# Patient Record
Sex: FEMALE | Race: WHITE | NOT HISPANIC OR LATINO | Employment: OTHER | URBAN - METROPOLITAN AREA
[De-identification: names, ages, dates, MRNs, and addresses within clinical notes are randomized per-mention and may not be internally consistent; named-entity substitution may affect disease eponyms.]

---

## 2020-11-24 PROCEDURE — U0004 COV-19 TEST NON-CDC HGH THRU: HCPCS | Performed by: NURSE PRACTITIONER

## 2020-11-29 ENCOUNTER — HOSPITAL ENCOUNTER (EMERGENCY)
Facility: HOSPITAL | Age: 50
Discharge: LEFT BEFORE DISCHARGE | End: 2020-11-30
Attending: EMERGENCY MEDICINE | Admitting: EMERGENCY MEDICINE

## 2020-11-29 ENCOUNTER — APPOINTMENT (OUTPATIENT)
Dept: CT IMAGING | Facility: HOSPITAL | Age: 50
End: 2020-11-29

## 2020-11-29 DIAGNOSIS — N28.9 ACUTE RENAL INSUFFICIENCY: ICD-10-CM

## 2020-11-29 DIAGNOSIS — F17.200 TOBACCO DEPENDENCE: ICD-10-CM

## 2020-11-29 DIAGNOSIS — B34.0 ADENOVIRUS INFECTION: Primary | ICD-10-CM

## 2020-11-29 DIAGNOSIS — E11.65 UNCONTROLLED TYPE 2 DIABETES MELLITUS WITH HYPERGLYCEMIA (HCC): ICD-10-CM

## 2020-11-29 DIAGNOSIS — R06.02 SHORTNESS OF BREATH: ICD-10-CM

## 2020-11-29 DIAGNOSIS — Z91.199 H/O NONCOMPLIANCE WITH MEDICAL TREATMENT, PRESENTING HAZARDS TO HEALTH: ICD-10-CM

## 2020-11-29 LAB
ALBUMIN SERPL-MCNC: 4.1 G/DL (ref 3.5–5.2)
ALBUMIN/GLOB SERPL: 1.1 G/DL
ALP SERPL-CCNC: 135 U/L (ref 39–117)
ALT SERPL W P-5'-P-CCNC: <5 U/L (ref 1–33)
ANION GAP SERPL CALCULATED.3IONS-SCNC: 13 MMOL/L (ref 5–15)
AST SERPL-CCNC: 12 U/L (ref 1–32)
B PARAPERT DNA SPEC QL NAA+PROBE: NOT DETECTED
B PERT DNA SPEC QL NAA+PROBE: NOT DETECTED
B-OH-BUTYR SERPL-SCNC: 0.39 MMOL/L (ref 0.02–0.27)
BASOPHILS # BLD AUTO: 0.07 10*3/MM3 (ref 0–0.2)
BASOPHILS NFR BLD AUTO: 0.4 % (ref 0–1.5)
BILIRUB SERPL-MCNC: 0.7 MG/DL (ref 0–1.2)
BILIRUB UR QL STRIP: NEGATIVE
BUN SERPL-MCNC: 25 MG/DL (ref 6–20)
BUN/CREAT SERPL: 19.8 (ref 7–25)
C PNEUM DNA NPH QL NAA+NON-PROBE: NOT DETECTED
CALCIUM SPEC-SCNC: 9.4 MG/DL (ref 8.6–10.5)
CHLORIDE SERPL-SCNC: 87 MMOL/L (ref 98–107)
CLARITY UR: CLEAR
CO2 SERPL-SCNC: 23 MMOL/L (ref 22–29)
COLOR UR: YELLOW
CREAT SERPL-MCNC: 1.26 MG/DL (ref 0.57–1)
CRP SERPL-MCNC: 0.67 MG/DL (ref 0–0.5)
D DIMER PPP FEU-MCNC: 0.42 MCGFEU/ML (ref 0–0.56)
D-LACTATE SERPL-SCNC: 2.2 MMOL/L (ref 0.5–2)
DEPRECATED RDW RBC AUTO: 42.2 FL (ref 37–54)
EOSINOPHIL # BLD AUTO: 0.1 10*3/MM3 (ref 0–0.4)
EOSINOPHIL NFR BLD AUTO: 0.6 % (ref 0.3–6.2)
ERYTHROCYTE [DISTWIDTH] IN BLOOD BY AUTOMATED COUNT: 12.6 % (ref 12.3–15.4)
FLUAV H1 2009 PAND RNA NPH QL NAA+PROBE: NOT DETECTED
FLUAV H1 HA GENE NPH QL NAA+PROBE: NOT DETECTED
FLUAV H3 RNA NPH QL NAA+PROBE: NOT DETECTED
FLUAV SUBTYP SPEC NAA+PROBE: NOT DETECTED
FLUBV RNA ISLT QL NAA+PROBE: NOT DETECTED
GFR SERPL CREATININE-BSD FRML MDRD: 45 ML/MIN/1.73
GLOBULIN UR ELPH-MCNC: 3.7 GM/DL
GLUCOSE BLDC GLUCOMTR-MCNC: 457 MG/DL (ref 70–130)
GLUCOSE BLDC GLUCOMTR-MCNC: 510 MG/DL (ref 70–130)
GLUCOSE SERPL-MCNC: 691 MG/DL (ref 65–99)
GLUCOSE UR STRIP-MCNC: ABNORMAL MG/DL
HADV DNA SPEC NAA+PROBE: DETECTED
HCOV 229E RNA SPEC QL NAA+PROBE: NOT DETECTED
HCOV HKU1 RNA SPEC QL NAA+PROBE: NOT DETECTED
HCOV NL63 RNA SPEC QL NAA+PROBE: NOT DETECTED
HCOV OC43 RNA SPEC QL NAA+PROBE: NOT DETECTED
HCT VFR BLD AUTO: 47 % (ref 34–46.6)
HGB BLD-MCNC: 16.8 G/DL (ref 12–15.9)
HGB UR QL STRIP.AUTO: NEGATIVE
HMPV RNA NPH QL NAA+NON-PROBE: NOT DETECTED
HOLD SPECIMEN: NORMAL
HOLD SPECIMEN: NORMAL
HPIV1 RNA SPEC QL NAA+PROBE: NOT DETECTED
HPIV2 RNA SPEC QL NAA+PROBE: NOT DETECTED
HPIV3 RNA NPH QL NAA+PROBE: NOT DETECTED
HPIV4 P GENE NPH QL NAA+PROBE: NOT DETECTED
IMM GRANULOCYTES # BLD AUTO: 0.11 10*3/MM3 (ref 0–0.05)
IMM GRANULOCYTES NFR BLD AUTO: 0.7 % (ref 0–0.5)
KETONES UR QL STRIP: ABNORMAL
LDH SERPL-CCNC: 225 U/L (ref 135–214)
LEUKOCYTE ESTERASE UR QL STRIP.AUTO: NEGATIVE
LYMPHOCYTES # BLD AUTO: 1.74 10*3/MM3 (ref 0.7–3.1)
LYMPHOCYTES NFR BLD AUTO: 11.2 % (ref 19.6–45.3)
M PNEUMO IGG SER IA-ACNC: NOT DETECTED
MCH RBC QN AUTO: 32.7 PG (ref 26.6–33)
MCHC RBC AUTO-ENTMCNC: 35.7 G/DL (ref 31.5–35.7)
MCV RBC AUTO: 91.4 FL (ref 79–97)
MONOCYTES # BLD AUTO: 0.94 10*3/MM3 (ref 0.1–0.9)
MONOCYTES NFR BLD AUTO: 6 % (ref 5–12)
NEUTROPHILS NFR BLD AUTO: 12.62 10*3/MM3 (ref 1.7–7)
NEUTROPHILS NFR BLD AUTO: 81.1 % (ref 42.7–76)
NITRITE UR QL STRIP: NEGATIVE
NRBC BLD AUTO-RTO: 0 /100 WBC (ref 0–0.2)
PH UR STRIP.AUTO: <=5 [PH] (ref 5–8)
PLATELET # BLD AUTO: 205 10*3/MM3 (ref 140–450)
PMV BLD AUTO: 12.5 FL (ref 6–12)
POTASSIUM SERPL-SCNC: 4.6 MMOL/L (ref 3.5–5.2)
PROCALCITONIN SERPL-MCNC: 0.04 NG/ML (ref 0–0.25)
PROT SERPL-MCNC: 7.8 G/DL (ref 6–8.5)
PROT UR QL STRIP: NEGATIVE
RBC # BLD AUTO: 5.14 10*6/MM3 (ref 3.77–5.28)
RHINOVIRUS RNA SPEC NAA+PROBE: NOT DETECTED
RSV RNA NPH QL NAA+NON-PROBE: NOT DETECTED
SARS-COV-2 RNA NPH QL NAA+NON-PROBE: NOT DETECTED
SODIUM SERPL-SCNC: 123 MMOL/L (ref 136–145)
SP GR UR STRIP: 1.03 (ref 1–1.03)
UROBILINOGEN UR QL STRIP: ABNORMAL
WBC # BLD AUTO: 15.58 10*3/MM3 (ref 3.4–10.8)
WHOLE BLOOD HOLD SPECIMEN: NORMAL
WHOLE BLOOD HOLD SPECIMEN: NORMAL

## 2020-11-29 PROCEDURE — 82010 KETONE BODYS QUAN: CPT | Performed by: PHYSICIAN ASSISTANT

## 2020-11-29 PROCEDURE — 83615 LACTATE (LD) (LDH) ENZYME: CPT | Performed by: PHYSICIAN ASSISTANT

## 2020-11-29 PROCEDURE — 0 IOPAMIDOL PER 1 ML: Performed by: EMERGENCY MEDICINE

## 2020-11-29 PROCEDURE — 81003 URINALYSIS AUTO W/O SCOPE: CPT | Performed by: PHYSICIAN ASSISTANT

## 2020-11-29 PROCEDURE — 82962 GLUCOSE BLOOD TEST: CPT

## 2020-11-29 PROCEDURE — 71275 CT ANGIOGRAPHY CHEST: CPT

## 2020-11-29 PROCEDURE — 85379 FIBRIN DEGRADATION QUANT: CPT | Performed by: PHYSICIAN ASSISTANT

## 2020-11-29 PROCEDURE — 99283 EMERGENCY DEPT VISIT LOW MDM: CPT

## 2020-11-29 PROCEDURE — 96375 TX/PRO/DX INJ NEW DRUG ADDON: CPT

## 2020-11-29 PROCEDURE — 63710000001 INSULIN REGULAR HUMAN PER 5 UNITS: Performed by: PHYSICIAN ASSISTANT

## 2020-11-29 PROCEDURE — 86140 C-REACTIVE PROTEIN: CPT | Performed by: PHYSICIAN ASSISTANT

## 2020-11-29 PROCEDURE — 83605 ASSAY OF LACTIC ACID: CPT | Performed by: PHYSICIAN ASSISTANT

## 2020-11-29 PROCEDURE — 96374 THER/PROPH/DIAG INJ IV PUSH: CPT

## 2020-11-29 PROCEDURE — 85025 COMPLETE CBC W/AUTO DIFF WBC: CPT

## 2020-11-29 PROCEDURE — 25010000002 DEXAMETHASONE PER 1 MG: Performed by: PHYSICIAN ASSISTANT

## 2020-11-29 PROCEDURE — 25010000002 ONDANSETRON PER 1 MG: Performed by: PHYSICIAN ASSISTANT

## 2020-11-29 PROCEDURE — 0202U NFCT DS 22 TRGT SARS-COV-2: CPT | Performed by: PHYSICIAN ASSISTANT

## 2020-11-29 PROCEDURE — 80053 COMPREHEN METABOLIC PANEL: CPT

## 2020-11-29 PROCEDURE — 84145 PROCALCITONIN (PCT): CPT | Performed by: PHYSICIAN ASSISTANT

## 2020-11-29 RX ORDER — SODIUM CHLORIDE 0.9 % (FLUSH) 0.9 %
10 SYRINGE (ML) INJECTION AS NEEDED
Status: DISCONTINUED | OUTPATIENT
Start: 2020-11-29 | End: 2020-11-30 | Stop reason: HOSPADM

## 2020-11-29 RX ORDER — DEXAMETHASONE SODIUM PHOSPHATE 4 MG/ML
8 INJECTION, SOLUTION INTRA-ARTICULAR; INTRALESIONAL; INTRAMUSCULAR; INTRAVENOUS; SOFT TISSUE ONCE
Status: COMPLETED | OUTPATIENT
Start: 2020-11-29 | End: 2020-11-29

## 2020-11-29 RX ORDER — ONDANSETRON 2 MG/ML
4 INJECTION INTRAMUSCULAR; INTRAVENOUS ONCE
Status: COMPLETED | OUTPATIENT
Start: 2020-11-29 | End: 2020-11-29

## 2020-11-29 RX ADMIN — INSULIN HUMAN 10 UNITS: 100 INJECTION, SOLUTION PARENTERAL at 22:18

## 2020-11-29 RX ADMIN — ONDANSETRON 4 MG: 2 INJECTION INTRAMUSCULAR; INTRAVENOUS at 20:08

## 2020-11-29 RX ADMIN — DEXAMETHASONE SODIUM PHOSPHATE 8 MG: 4 INJECTION, SOLUTION INTRAMUSCULAR; INTRAVENOUS at 20:08

## 2020-11-29 RX ADMIN — SODIUM CHLORIDE 1000 ML: 9 INJECTION, SOLUTION INTRAVENOUS at 20:08

## 2020-11-29 RX ADMIN — SODIUM CHLORIDE 1000 ML: 9 INJECTION, SOLUTION INTRAVENOUS at 22:18

## 2020-11-29 RX ADMIN — IOPAMIDOL 40 ML: 755 INJECTION, SOLUTION INTRAVENOUS at 21:13

## 2020-11-30 VITALS
TEMPERATURE: 98.3 F | RESPIRATION RATE: 18 BRPM | OXYGEN SATURATION: 96 % | WEIGHT: 250 LBS | HEART RATE: 78 BPM | BODY MASS INDEX: 42.68 KG/M2 | HEIGHT: 64 IN | DIASTOLIC BLOOD PRESSURE: 70 MMHG | SYSTOLIC BLOOD PRESSURE: 109 MMHG

## 2020-11-30 LAB
GLUCOSE BLDC GLUCOMTR-MCNC: 459 MG/DL (ref 70–130)
LACTATE HOLD SPECIMEN: NORMAL

## 2020-11-30 PROCEDURE — 82962 GLUCOSE BLOOD TEST: CPT

## 2021-03-15 ENCOUNTER — TRANSCRIBE ORDERS (OUTPATIENT)
Dept: DIABETES SERVICES | Facility: HOSPITAL | Age: 51
End: 2021-03-15

## 2021-03-15 DIAGNOSIS — IMO0002 DIABETES MELLITUS TYPE 2, UNCONTROLLED, WITH COMPLICATIONS: Primary | ICD-10-CM

## 2021-03-15 DIAGNOSIS — E78.5 HYPERLIPIDEMIA, UNSPECIFIED HYPERLIPIDEMIA TYPE: ICD-10-CM

## 2021-04-05 ENCOUNTER — HOSPITAL ENCOUNTER (OUTPATIENT)
Dept: NUTRITION | Facility: HOSPITAL | Age: 51
Setting detail: RECURRING SERIES
Discharge: HOME | End: 2021-04-05

## 2021-04-05 NOTE — CONSULTS
Outpatient Nutrition Services    Patient Name:  Michelle Estrada  YOB: 1970  MRN: [id number]  Admit Date:  4/5/2021    DIABETES NUTRITION EDUCATION CONSULT, 60 MINUTES. This medical referred consult was provided as a telephone call, tele-health or e-visit, as patient is unable to attend an in-office appointment due to the COVID-19 crisis. Consent for treatment was given verbally.Patient attended their comprehensive nutrition only diabetes class.  Please see media tab for assessment and notes if you use EPIC. If you are not an EPIC user a copy of patient's assessment and notes will be sent per routine. Thank you for this referral.        Electronically signed by:  Jory Miller RD  04/05/21 12:24 EDT

## 2021-04-27 ENCOUNTER — HOSPITAL ENCOUNTER (OUTPATIENT)
Dept: NUTRITION | Facility: HOSPITAL | Age: 51
Setting detail: RECURRING SERIES
Discharge: HOME | End: 2021-04-27

## 2021-04-27 NOTE — PROGRESS NOTES
DIABETES NUTRITION EDUCATION CONSULT FOLLOW UP, 30 MINUTES. This medical referred consult was provided as a telephone call, tele-health or e-visit, as patient is unable to attend an in-office appointment due to the COVID-19 crisis. Consent for treatment was given verbally.Patient attended their comprehensive nutrition only diabetes class.  Please see media tab for assessment and notes if you use EPIC. If you are not an EPIC user a copy of patient's assessment and notes will be sent per routine. Thank you for this referral.      Jory Miller RD, LD

## 2021-08-10 ENCOUNTER — TRANSCRIBE ORDERS (OUTPATIENT)
Dept: LAB | Facility: HOSPITAL | Age: 51
End: 2021-08-10

## 2021-08-10 ENCOUNTER — LAB (OUTPATIENT)
Dept: LAB | Facility: HOSPITAL | Age: 51
End: 2021-08-10

## 2021-08-10 DIAGNOSIS — Z87.898 PERSONAL HISTORY OF OTHER LYMPHATIC AND HEMATOPOIETIC NEOPLASM: ICD-10-CM

## 2021-08-10 DIAGNOSIS — Z01.818 OTHER SPECIFIED PRE-OPERATIVE EXAMINATION: ICD-10-CM

## 2021-08-10 DIAGNOSIS — Z87.898 PERSONAL HISTORY OF OTHER LYMPHATIC AND HEMATOPOIETIC NEOPLASM: Primary | ICD-10-CM

## 2021-08-10 DIAGNOSIS — R73.09 IMPAIRED GLUCOSE TOLERANCE TEST: ICD-10-CM

## 2021-08-10 LAB
ANION GAP SERPL CALCULATED.3IONS-SCNC: 7 MMOL/L (ref 5–15)
BUN SERPL-MCNC: 12 MG/DL (ref 6–20)
BUN/CREAT SERPL: 12.1 (ref 7–25)
CALCIUM SPEC-SCNC: 9.4 MG/DL (ref 8.6–10.5)
CHLORIDE SERPL-SCNC: 98 MMOL/L (ref 98–107)
CO2 SERPL-SCNC: 29 MMOL/L (ref 22–29)
CREAT SERPL-MCNC: 0.99 MG/DL (ref 0.57–1)
DEPRECATED RDW RBC AUTO: 45.1 FL (ref 37–54)
ERYTHROCYTE [DISTWIDTH] IN BLOOD BY AUTOMATED COUNT: 13.2 % (ref 12.3–15.4)
GFR SERPL CREATININE-BSD FRML MDRD: 59 ML/MIN/1.73
GLUCOSE SERPL-MCNC: 473 MG/DL (ref 65–99)
HBA1C MFR BLD: 10.3 % (ref 4.8–5.6)
HCT VFR BLD AUTO: 38.6 % (ref 34–46.6)
HGB BLD-MCNC: 13 G/DL (ref 12–15.9)
MCH RBC QN AUTO: 31.5 PG (ref 26.6–33)
MCHC RBC AUTO-ENTMCNC: 33.7 G/DL (ref 31.5–35.7)
MCV RBC AUTO: 93.5 FL (ref 79–97)
PLATELET # BLD AUTO: 154 10*3/MM3 (ref 140–450)
PMV BLD AUTO: 12.6 FL (ref 6–12)
POTASSIUM SERPL-SCNC: 4.6 MMOL/L (ref 3.5–5.2)
QT INTERVAL: 394 MS
QTC INTERVAL: 451 MS
RBC # BLD AUTO: 4.13 10*6/MM3 (ref 3.77–5.28)
SODIUM SERPL-SCNC: 134 MMOL/L (ref 136–145)
WBC # BLD AUTO: 6.49 10*3/MM3 (ref 3.4–10.8)

## 2021-08-10 PROCEDURE — 83036 HEMOGLOBIN GLYCOSYLATED A1C: CPT

## 2021-08-10 PROCEDURE — 80048 BASIC METABOLIC PNL TOTAL CA: CPT

## 2021-08-10 PROCEDURE — 36415 COLL VENOUS BLD VENIPUNCTURE: CPT

## 2021-08-10 PROCEDURE — 93010 ELECTROCARDIOGRAM REPORT: CPT | Performed by: INTERNAL MEDICINE

## 2021-08-10 PROCEDURE — 93005 ELECTROCARDIOGRAM TRACING: CPT | Performed by: ORTHOPAEDIC SURGERY

## 2021-08-10 PROCEDURE — 85027 COMPLETE CBC AUTOMATED: CPT

## 2021-08-15 ENCOUNTER — APPOINTMENT (OUTPATIENT)
Dept: PREADMISSION TESTING | Facility: HOSPITAL | Age: 51
End: 2021-08-15

## 2021-10-14 ENCOUNTER — MEDICATION THERAPY MANAGEMENT (OUTPATIENT)
Dept: PHARMACY | Facility: HOSPITAL | Age: 51
End: 2021-10-14

## 2021-10-14 ENCOUNTER — OFFICE VISIT (OUTPATIENT)
Dept: ENDOCRINOLOGY | Facility: CLINIC | Age: 51
End: 2021-10-14

## 2021-10-14 VITALS
TEMPERATURE: 97.6 F | HEART RATE: 87 BPM | OXYGEN SATURATION: 98 % | DIASTOLIC BLOOD PRESSURE: 73 MMHG | BODY MASS INDEX: 42.34 KG/M2 | HEIGHT: 64 IN | WEIGHT: 248 LBS | SYSTOLIC BLOOD PRESSURE: 109 MMHG

## 2021-10-14 DIAGNOSIS — E11.65 TYPE 2 DIABETES MELLITUS WITH HYPERGLYCEMIA, WITH LONG-TERM CURRENT USE OF INSULIN (HCC): Primary | ICD-10-CM

## 2021-10-14 DIAGNOSIS — Z79.4 TYPE 2 DIABETES MELLITUS WITH HYPERGLYCEMIA, WITH LONG-TERM CURRENT USE OF INSULIN (HCC): Primary | ICD-10-CM

## 2021-10-14 PROCEDURE — 99214 OFFICE O/P EST MOD 30 MIN: CPT | Performed by: NURSE PRACTITIONER

## 2021-10-14 RX ORDER — DULAGLUTIDE 0.75 MG/.5ML
0.75 INJECTION, SOLUTION SUBCUTANEOUS WEEKLY
Qty: 2 ML | Refills: 1 | Status: SHIPPED | OUTPATIENT
Start: 2021-10-14 | End: 2021-12-14 | Stop reason: SDUPTHER

## 2021-10-14 RX ORDER — LAMOTRIGINE 25 MG/1
50 TABLET ORAL 2 TIMES DAILY
COMMUNITY
Start: 2021-07-27 | End: 2021-12-14 | Stop reason: SDUPTHER

## 2021-10-14 RX ORDER — FENOFIBRATE 145 MG/1
145 TABLET, COATED ORAL DAILY
COMMUNITY
End: 2021-12-14 | Stop reason: SDUPTHER

## 2021-10-14 RX ORDER — ROSUVASTATIN CALCIUM 20 MG/1
20 TABLET, COATED ORAL DAILY
Qty: 30 TABLET | Refills: 5 | Status: SHIPPED | OUTPATIENT
Start: 2021-10-14 | End: 2022-06-13 | Stop reason: SDUPTHER

## 2021-10-14 RX ORDER — INSULIN ASPART 100 [IU]/ML
35 INJECTION, SOLUTION INTRAVENOUS; SUBCUTANEOUS
Qty: 30 ML | Refills: 5 | Status: SHIPPED | OUTPATIENT
Start: 2021-10-14 | End: 2022-05-20 | Stop reason: SDUPTHER

## 2021-10-14 RX ORDER — INSULIN GLARGINE 300 U/ML
75 INJECTION, SOLUTION SUBCUTANEOUS
Qty: 9 ML | Refills: 5 | Status: SHIPPED | OUTPATIENT
Start: 2021-10-14 | End: 2022-05-20 | Stop reason: SDUPTHER

## 2021-10-14 RX ORDER — INSULIN ASPART 100 [IU]/ML
45 INJECTION, SOLUTION INTRAVENOUS; SUBCUTANEOUS
COMMUNITY
Start: 2021-07-07 | End: 2021-10-14

## 2021-10-14 RX ORDER — METFORMIN HYDROCHLORIDE EXTENDED-RELEASE TABLETS 500 MG/1
500 TABLET, FILM COATED, EXTENDED RELEASE ORAL
Qty: 30 TABLET | Refills: 5 | Status: SHIPPED | OUTPATIENT
Start: 2021-10-14 | End: 2021-10-18

## 2021-10-14 RX ORDER — ROSUVASTATIN CALCIUM 20 MG/1
20 TABLET, COATED ORAL DAILY
COMMUNITY
End: 2021-10-14

## 2021-10-14 RX ORDER — INSULIN GLARGINE 300 U/ML
75 INJECTION, SOLUTION SUBCUTANEOUS
COMMUNITY
Start: 2021-08-10 | End: 2021-12-14 | Stop reason: SDUPTHER

## 2021-10-14 RX ORDER — METFORMIN HYDROCHLORIDE 500 MG/1
500 TABLET, EXTENDED RELEASE ORAL
COMMUNITY
Start: 2021-07-07 | End: 2021-10-14

## 2021-10-14 RX ORDER — PEN NEEDLE, DIABETIC 31 GX5/16"
NEEDLE, DISPOSABLE MISCELLANEOUS
COMMUNITY
Start: 2021-07-07 | End: 2022-09-28

## 2021-10-14 RX ORDER — ESCITALOPRAM OXALATE 10 MG/1
10 TABLET ORAL DAILY
COMMUNITY
Start: 2021-07-30 | End: 2021-12-14 | Stop reason: SDUPTHER

## 2021-10-14 RX ORDER — DULAGLUTIDE 0.75 MG/.5ML
0.5 INJECTION, SOLUTION SUBCUTANEOUS WEEKLY
Qty: 2 ML | Refills: 2 | Status: SHIPPED | OUTPATIENT
Start: 2021-10-14 | End: 2021-10-14

## 2021-10-14 RX ORDER — HYDROXYZINE HYDROCHLORIDE 10 MG/1
TABLET, FILM COATED ORAL DAILY PRN
COMMUNITY
Start: 2021-07-27

## 2021-10-14 NOTE — PROGRESS NOTES
Chief Complaint   Patient presents with   • Diabetes     initial encounter        Referring Provider  Maye Clements MD     HPI   Michelle Estrada is a 51 y.o. female had concerns including Diabetes (initial encounter).   She is seen as new patient today.   Diabetes was diagnosed in 2002 after having GDM in 2001.  Complications include retinopathy, neuropathy, unknown on kidney damage, no cardiac issues.  Last ophtho exam was 2 years ago.  Current medications for diabetes include Toujeo 75 units qhs, Novolog 35 units TID with meals (not always TID), Metformin 500 mg.  She is on a statin.  She has been taking her Toujeo most days and has been taking her novolog when she eats, but is frequently missing meals and missing mealtime insulin.   Past medications: injection in the past, not sure the name of it.  She checks her blood sugar not very often times per day, is moving and has not been checking it much.   BG's: F: 200's, PPD: 300-400  Hypos: occasionally (140's)    Diet:  Breakfast: not much  Lunch: varies  Dinner: cooking at home, usually carbs  Drinks: water, coffee, regular soda  Snacks: not much, if she does fruits and veggies  Exercise: not much  Had nutritional counseling in April.    She denies any previous history of pancreatitis or family history of MTC or MEN.    The following portions of the patient's history were reviewed and updated as appropriate: allergies, current medications, past family history, past medical history, past social history, past surgical history and problem list.    Past Medical History:   Diagnosis Date   • Anxiety    • Bipolar 1 disorder (HCC)    • COPD (chronic obstructive pulmonary disease) (HCC)    • Depression    • Diabetes mellitus (HCC)    • Fibromyalgia    • Neuropathy    • PTSD (post-traumatic stress disorder)      Past Surgical History:   Procedure Laterality Date   • CHOLECYSTECTOMY     • CYST REMOVAL     • KIDNEY STONE SURGERY     • TUBAL ABDOMINAL LIGATION         History reviewed. No pertinent family history.   Social History     Socioeconomic History   • Marital status: Single   Tobacco Use   • Smoking status: Current Every Day Smoker   Substance and Sexual Activity   • Alcohol use: Never   • Drug use: Not Currently     Comment: recovering   • Sexual activity: Never      Allergies   Allergen Reactions   • Dilantin [Phenytoin] Hives   • Lisinopril Cough      Current Outpatient Medications on File Prior to Visit   Medication Sig Dispense Refill   • B-D ULTRAFINE III SHORT PEN 31G X 8 MM misc      • escitalopram (LEXAPRO) 10 MG tablet Take 10 mg by mouth Daily.     • fenofibrate (TRICOR) 145 MG tablet Take 145 mg by mouth Daily.     • hydrOXYzine (ATARAX) 10 MG tablet TAKE 1 TABLET BY MOUTH 3 TIMES A DAY AS NEED FOR ANXIETY & SLEEP, MAY TAKE 2 AT BEDTIME IF NEEDED     • lamoTRIgine (LaMICtal) 25 MG tablet Take 50 mg by mouth 2 (two) times a day.     • Toujeo SoloStar 300 UNIT/ML solution pen-injector injection Inject 75 Units under the skin into the appropriate area as directed every night at bedtime.     • [DISCONTINUED] metFORMIN ER (GLUCOPHAGE-XR) 500 MG 24 hr tablet Take 500 mg by mouth every night at bedtime.     • [DISCONTINUED] NovoLOG FlexPen 100 UNIT/ML solution pen-injector sc pen Inject 45 Units under the skin into the appropriate area as directed 3 (Three) Times a Day With Meals.     • [DISCONTINUED] rosuvastatin (CRESTOR) 20 MG tablet Take 20 mg by mouth Daily.     • [DISCONTINUED] methylPREDNISolone (MEDROL) 4 MG dose pack Take as directed on package instructions. 21 tablet 0     No current facility-administered medications on file prior to visit.        Review of Systems   Constitutional: Positive for fatigue and unexpected weight gain.   HENT: Positive for congestion, rhinorrhea and sneezing.    Eyes: Positive for discharge, redness and itching.   Respiratory: Positive for cough, shortness of breath and wheezing.    Gastrointestinal: Positive for constipation  "and GERD. Negative for nausea.   Endocrine: Positive for cold intolerance, heat intolerance, polydipsia and polyuria.   Genitourinary: Positive for urgency and urinary incontinence.   Musculoskeletal: Positive for arthralgias, back pain, gait problem and neck pain.   Allergic/Immunologic: Positive for food allergies.   Neurological: Positive for tremors and syncope.   Psychiatric/Behavioral: Positive for decreased concentration, hallucinations and sleep disturbance. Negative for suicidal ideas. The patient is nervous/anxious.         /73 (BP Location: Left arm, Patient Position: Sitting, Cuff Size: Large Adult)   Pulse 87   Temp 97.6 °F (36.4 °C) (Oral)   Ht 162.6 cm (64\")   Wt 112 kg (248 lb)   SpO2 98%   BMI 42.57 kg/m²      Physical Exam  Vitals reviewed.   Constitutional:       Appearance: Normal appearance.   Eyes:      Extraocular Movements: Extraocular movements intact.   Neck:      Comments: No goiter  Cardiovascular:      Rate and Rhythm: Normal rate and regular rhythm.      Pulses: Normal pulses.           Dorsalis pedis pulses are 2+ on the right side and 2+ on the left side.        Posterior tibial pulses are 2+ on the right side and 2+ on the left side.      Heart sounds: Normal heart sounds.   Pulmonary:      Effort: Pulmonary effort is normal.      Breath sounds: Normal breath sounds.   Feet:      Right foot:      Protective Sensation: 10 sites tested. 4 sites sensed.      Skin integrity: Skin integrity normal.      Toenail Condition: Right toenails are normal.      Left foot:      Protective Sensation: 10 sites tested. 4 sites sensed.      Skin integrity: Skin integrity normal.      Toenail Condition: Left toenails are normal.      Comments: Diabetic Foot Exam Performed and Monofilament Test Performed  Neurological:      Mental Status: She is alert and oriented to person, place, and time.   Psychiatric:         Mood and Affect: Mood normal.         Behavior: Behavior normal.         " Thought Content: Thought content normal.         Judgment: Judgment normal.           CMP:  Lab Results   Component Value Date    BUN 12 08/10/2021    CREATININE 0.99 08/10/2021    EGFRIFNONA 59 (L) 08/10/2021    BCR 12.1 08/10/2021     (L) 08/10/2021    K 4.6 08/10/2021    CO2 29.0 08/10/2021    CALCIUM 9.4 08/10/2021    ALBUMIN 4.10 11/29/2020    BILITOT 0.7 11/29/2020    ALKPHOS 135 (H) 11/29/2020    AST 12 11/29/2020    ALT <5 11/29/2020     Lipid Panel:  No results found for: CHOL, TRIG, HDL, VLDL, LDL  HbA1c:  Lab Results   Component Value Date    HGBA1C 10.30 (H) 08/10/2021     Glucose:  Lab Results   Component Value Date    POCGLU ([facility]) 444+ 02/21/2021     Microalbumin:  No results found for: MALBCRERATIO  TSH:  No results found for: TSH    Assessment and Plan    Diagnoses and all orders for this visit:    1. Type 2 diabetes mellitus with hyperglycemia, with long-term current use of insulin (HCC) (Primary)  Assessment & Plan:  -Diabetes is uncontrolled with A1C >10.  -She is agreeable to implement dietary changes and exercise as tolerable for her.  -She will check her BG more often.  -Continue Toujeo 75 units qhs.  -Continue Novolog 35 units with meals.  She is going to get on a better regimen of eating more consistent meals and taking her mealtime insulin.  -Start using Freestyle Wilda CGM.  -Start Trulicity 0.75 mg weekly.   -Schedule optho exam.  -Diabetes will be reassessed in 2 months.  -S/S of hypoglycemia reviewed.          Orders:  -     Discontinue: Continuous Blood Gluc  (FreeStyle Wilda 2 [place]) device; 1 kit Every 3 (Three) Months.  Dispense: 1 each; Refill: 2  -     Discontinue: Continuous Blood Gluc Sensor (FreeStyle Wilda 2 Sensor) misc; 1 kit Every 14 (Fourteen) Days.  Dispense: 6 each; Refill: 2  -     Discontinue: Dulaglutide (Trulicity) 0.75 MG/0.5ML solution pen-injector; Inject 0.75 mg under the skin into the appropriate area as directed 1 (One) Time Per Week.  Dispense: 2  mL; Refill: 2  -     Ambulatory Referral to Specialty Pharmacy       Return in about 2 months (around 12/14/2021) for Fasting Labs, A1C, Follow-up appointment. The patient was instructed to contact the clinic with any interval questions or concerns.    JIN Jara

## 2021-10-14 NOTE — PROGRESS NOTES
Michelle Estrada is a 51 y.o. female seen by Endocrinology and assessed by the Medication Management Clinic Pharmacist at [facility]. Patient recently moved to [place] from [place]. She has been a poorly controlled T2D for 20 years.      Past Medical History:   Diagnosis Date   • Anxiety    • Bipolar 1 disorder (HCC)    • COPD (chronic obstructive pulmonary disease) (HCC)    • Depression    • Diabetes mellitus (HCC)    • Fibromyalgia    • Neuropathy    • PTSD (post-traumatic stress disorder)      Social History     Socioeconomic History   • Marital status: Single   Tobacco Use   • Smoking status: Current Every Day Smoker   Substance and Sexual Activity   • Alcohol use: Never   • Drug use: Not Currently     Comment: recovering   • Sexual activity: Never     Dilantin [phenytoin] and Lisinopril    Current Outpatient Medications:   •  B-D ULTRAFINE III SHORT PEN 31G X 8 MM misc, , Disp: , Rfl:   •  escitalopram (LEXAPRO) 10 MG tablet, Take 10 mg by mouth Daily., Disp: , Rfl:   •  fenofibrate (TRICOR) 145 MG tablet, Take 145 mg by mouth Daily., Disp: , Rfl:   •  hydrOXYzine (ATARAX) 10 MG tablet, TAKE 1 TABLET BY MOUTH 3 TIMES A DAY AS NEED FOR ANXIETY & SLEEP, MAY TAKE 2 AT BEDTIME IF NEEDED, Disp: , Rfl:   •  lamoTRIgine (LaMICtal) 25 MG tablet, Take 50 mg by mouth 2 (two) times a day., Disp: , Rfl:   •  metFORMIN ER (GLUCOPHAGE-XR) 500 MG 24 hr tablet, Take 500 mg by mouth every night at bedtime., Disp: , Rfl:   •  NovoLOG FlexPen 100 UNIT/ML solution pen-injector sc pen, Inject 45 Units under the skin into the appropriate area as directed 3 (Three) Times a Day With Meals., Disp: , Rfl:   •  rosuvastatin (CRESTOR) 20 MG tablet, Take 20 mg by mouth Daily., Disp: , Rfl:   •  Toujeo SoloStar 300 UNIT/ML solution pen-injector injection, Inject 75 Units under the skin into the appropriate area as directed every night at bedtime., Disp: , Rfl:       Labs:    There were no vitals filed for this visit.  There were  no vitals filed for this visit.  Lab Results   Component Value Date    HGBA1C 10.30 (H) 08/10/2021     Lab Results   Component Value Date    GLUCOSE 473 (C) 08/10/2021    CALCIUM 9.4 08/10/2021     (L) 08/10/2021    K 4.6 08/10/2021    CO2 29.0 08/10/2021    CL 98 08/10/2021    BUN 12 08/10/2021    CREATININE 0.99 08/10/2021    EGFRIFNONA 59 (L) 08/10/2021    BCR 12.1 08/10/2021    ANIONGAP 7.0 08/10/2021     No results found for: CHOL, CHLPL, TRIG, HDL, LDL, LDLDIRECT      Current 10-Year ASCVD Risk: no recent labs, but is on crestor 20 mg.     Drug-Drug Interactions: none relevant    Vaccination Status    Influenza: needed  Pneumococcal: UTD    Patient Assistance: not needed at this time    Medication Assessment & Plan:     Patient was counseled on how her metformin and insulin work in her body to help control her blood sugar. She was also counseled on the Rule of 15; she stated she had never heard it explained like that, but was knowledgeable about how to manage hypoglycemia. She could benefit from GLP-1 and/or increase in metformin dose.     Patient wants to switch pharmacies to [facility] since she is still using [facility] in [place]. We will transfer medications and help her with adherence and call to  on new changes.     Chinyere Dyson [facility]  10/14/2021  09:04 EDT

## 2021-10-14 NOTE — ASSESSMENT & PLAN NOTE
-Diabetes is uncontrolled with A1C >10.  -She is agreeable to implement dietary changes and exercise as tolerable for her.  -She will check her BG more often.  -Continue Toujeo 75 units qhs.  -Continue Novolog 35 units with meals.  She is going to get on a better regimen of eating more consistent meals and taking her mealtime insulin.  -Start using Freestyle Wilda CGM.  -Start Trulicity 0.75 mg weekly.   -Schedule optho exam.  -Diabetes will be reassessed in 2 months.  -S/S of hypoglycemia reviewed.

## 2021-10-18 ENCOUNTER — SPECIALTY PHARMACY (OUTPATIENT)
Dept: PHARMACY | Facility: HOSPITAL | Age: 51
End: 2021-10-18

## 2021-10-18 RX ORDER — METFORMIN HYDROCHLORIDE 500 MG/1
500 TABLET, EXTENDED RELEASE ORAL
Qty: 30 TABLET | Refills: 5 | Status: SHIPPED | OUTPATIENT
Start: 2021-10-18 | End: 2022-06-29 | Stop reason: SDUPTHER

## 2021-10-18 NOTE — PROGRESS NOTES
Specialty Pharmacy Note      Name:  Michelle Estrada  :  1970  Date:  10/18/2021         Past Medical History:   Diagnosis Date   • Anxiety    • Bipolar 1 disorder (HCC)    • COPD (chronic obstructive pulmonary disease) (HCC)    • Depression    • Diabetes mellitus (HCC)    • Fibromyalgia    • Neuropathy    • PTSD (post-traumatic stress disorder)        Past Surgical History:   Procedure Laterality Date   • CHOLECYSTECTOMY     • CYST REMOVAL     • KIDNEY STONE SURGERY     • TUBAL ABDOMINAL LIGATION         Social History     Socioeconomic History   • Marital status: Single   Tobacco Use   • Smoking status: Current Every Day Smoker   Substance and Sexual Activity   • Alcohol use: Never   • Drug use: Not Currently     Comment: recovering   • Sexual activity: Never       No family history on file.    Allergies   Allergen Reactions   • Dilantin [Phenytoin] Hives   • Lisinopril Cough       Current Outpatient Medications   Medication Sig Dispense Refill   • B-D ULTRAFINE III SHORT PEN 31G X 8 MM misc      • Continuous Blood Gluc  (FreeStyle Wilda 2 [place]) device Use 1 kit  As Needed (Use with sensor). 1 each 0   • Continuous Blood Gluc Sensor (FreeStyle Wilda 2 Sensor) misc Use 1 sensor Every 14 (Fourteen) Days. 2 each 5   • Dulaglutide (Trulicity) 0.75 MG/0.5ML solution pen-injector Inject 0.75 mg under the skin into the appropriate area as directed 1 (One) Time Per Week. 2 mL 1   • escitalopram (LEXAPRO) 10 MG tablet Take 10 mg by mouth Daily.     • escitalopram (LEXAPRO) 10 MG tablet Take 1 tablet by mouth Daily. 90 tablet 0   • fenofibrate (TRICOR) 145 MG tablet Take 145 mg by mouth Daily.     • fenofibrate (TRICOR) 145 MG tablet Take 1 tablet by mouth Daily. 90 tablet 0   • hydrOXYzine (ATARAX) 10 MG tablet TAKE 1 TABLET BY MOUTH 3 TIMES A DAY AS NEED FOR ANXIETY & SLEEP, MAY TAKE 2 AT BEDTIME IF NEEDED     • hydrOXYzine (ATARAX) 25 MG tablet Take 1 tablet by mouth 3 (Three) Times a Day As Needed  for Anxiety/Sleep. 90 tablet 0   • insulin aspart (NovoLOG FlexPen) 100 UNIT/ML solution pen-injector sc pen Inject 35 Units under the skin into the appropriate area as directed 3 (Three) Times a Day With Meals. 30 mL 5   • Insulin Glargine, 2 Unit Dial, (Toujeo Max SoloStar) 300 UNIT/ML solution pen-injector injection Inject 75 Units under the skin into the appropriate area as directed every night at bedtime. 9 mL 5   • lamoTRIgine (LaMICtal) 25 MG tablet Take 50 mg by mouth 2 (two) times a day.     • lamoTRIgine (LaMICtal) 25 MG tablet Take 2 tablets by mouth 2 (two) times a day. 360 tablet 0   • metFORMIN ER (GLUCOPHAGE-XR) 500 MG 24 hr tablet Take 1 tablet by mouth every night at bedtime. 30 tablet 5   • rosuvastatin (CRESTOR) 20 MG tablet Take 1 tablet by mouth Daily. 30 tablet 5   • Toujeo SoloStar 300 UNIT/ML solution pen-injector injection Inject 75 Units under the skin into the appropriate area as directed every night at bedtime.       No current facility-administered medications for this visit.         LABORATORY:    Lab Results   Component Value Date    WBC 6.49 08/10/2021    HGB 13.0 08/10/2021    HCT 38.6 08/10/2021    MCV 93.5 08/10/2021    RDW 13.2 08/10/2021     08/10/2021    NEUTRORELPCT 81.1 (H) 11/29/2020    LYMPHORELPCT 11.2 (L) 11/29/2020    MONORELPCT 6.0 11/29/2020    EOSRELPCT 0.6 11/29/2020    BASORELPCT 0.4 11/29/2020    NEUTROABS 12.62 (H) 11/29/2020    LYMPHSABS 1.74 11/29/2020       Lab Results   Component Value Date     (L) 08/10/2021    K 4.6 08/10/2021    CO2 29.0 08/10/2021    CL 98 08/10/2021    BUN 12 08/10/2021    CREATININE 0.99 08/10/2021    GLUCOSE 473 (C) 08/10/2021    CALCIUM 9.4 08/10/2021    ALKPHOS 135 (H) 11/29/2020    AST 12 11/29/2020    ALT <5 11/29/2020    BILITOT 0.7 11/29/2020    ALBUMIN 4.10 11/29/2020    PROTEINTOT 7.8 11/29/2020       Lab Results   Component Value Date     (H) 11/29/2020        Imaging Results (Last 24 Hours)     ** No results  found for the last 24 hours. **          ASSESSMENT/PLAN:    Patient Update Assessment (new medications, allergies, medical history): Patient will be starting Toujeo SoloStar 300 unit/ml pen-injector, along with Freestyle Wilda Glucose kit.    Medication(s): Toujeo SoloStar 300 unit/ml pen-injector, Trulicity 0.75 mg/0.5 ml pen-injector, Novolog FlexPen 100 unit/ml pen-injector, Lamotrogine 25 mg tablet, Metformin  mg 24 hr tablet, Hydroxyzine 25 mg tablet, FreeStyle Wilda 2 Sensor, FreeStyle Wilda 2 [place]    Currently Taking Medication(s): Patient is currently taking medication as prescribed.    Effectiveness of Medication: Effective    Experiencing Side Effects: None reported at this time.    Prior Authorization Status: Approved    Financial Assistance Status: None needed at this time. ($35 patient co-pay)    Any Issues Identified: None    Appropriate to Process Prescription(s): Yes, medication refill is due.    Counseling Offered: Informed Marija Dyson (Endo Pharmacist) of patient's question and provided her with patient's preferred contact number for counseling.    Next Specialty Pharmacy Visit: ~28 days

## 2021-10-25 ENCOUNTER — TELEPHONE (OUTPATIENT)
Dept: ENDOCRINOLOGY | Facility: CLINIC | Age: 51
End: 2021-10-25

## 2021-10-25 NOTE — TELEPHONE ENCOUNTER
Patient called and states she has freestyle Wilda continuous glucose monitor. Patient states over the weekend she hit the facing of a door and knocked the monitor loose from her body. I consulted JIN Jara and Lana howe to inform patient that she will have to contact the Freestyle Wilda company and let them know what happened and they should send her a new one. Insurance will not pay for more than what is prescribed within a certain time frame. Per Lana, Michelle is to check her blood sugar as directed until she gets a new Wilda in the mail. I called to inform Michelle, and she verbally understood. Patient was appreciative of my call.

## 2021-11-03 ENCOUNTER — SPECIALTY PHARMACY (OUTPATIENT)
Dept: PHARMACY | Facility: HOSPITAL | Age: 51
End: 2021-11-03

## 2021-11-11 ENCOUNTER — SPECIALTY PHARMACY (OUTPATIENT)
Dept: PHARMACY | Facility: HOSPITAL | Age: 51
End: 2021-11-11

## 2021-11-30 ENCOUNTER — SPECIALTY PHARMACY (OUTPATIENT)
Dept: PHARMACY | Facility: HOSPITAL | Age: 51
End: 2021-11-30

## 2021-12-07 ENCOUNTER — SPECIALTY PHARMACY (OUTPATIENT)
Dept: PHARMACY | Facility: HOSPITAL | Age: 51
End: 2021-12-07

## 2021-12-07 NOTE — PROGRESS NOTES
Specialty Pharmacy from 11/11/2021 in [facility] with Chinyere Dyson RPH Specialty Pharmacy from 12/7/2021 in [facility] with Chinyere Dyson RPH    11/11/21 12/7/21    1428   0904     Refill Coordination     Changes to allergies? No No   Changes to medications? No No   New conditions since last clinic visit No No   Unplanned office visit, urgent care, ED, or hospital admission in the last 4 weeks  No No   How does patient/caregiver feel medication is working? Good Good   Financial problems or insurance changes  No No        How many doses of your specialty medications were missed in the last 4 weeks? 2-3 0   Why were doses missed? Just forgot N/A

## 2021-12-14 ENCOUNTER — OFFICE VISIT (OUTPATIENT)
Dept: ENDOCRINOLOGY | Facility: CLINIC | Age: 51
End: 2021-12-14

## 2021-12-14 ENCOUNTER — SPECIALTY PHARMACY (OUTPATIENT)
Dept: PHARMACY | Facility: HOSPITAL | Age: 51
End: 2021-12-14

## 2021-12-14 VITALS
TEMPERATURE: 98.5 F | DIASTOLIC BLOOD PRESSURE: 69 MMHG | OXYGEN SATURATION: 94 % | HEART RATE: 94 BPM | HEIGHT: 64 IN | SYSTOLIC BLOOD PRESSURE: 122 MMHG | WEIGHT: 256 LBS | BODY MASS INDEX: 43.71 KG/M2

## 2021-12-14 DIAGNOSIS — Z79.4 TYPE 2 DIABETES MELLITUS WITH HYPERGLYCEMIA, WITH LONG-TERM CURRENT USE OF INSULIN (HCC): Primary | ICD-10-CM

## 2021-12-14 DIAGNOSIS — E11.65 TYPE 2 DIABETES MELLITUS WITH HYPERGLYCEMIA, WITH LONG-TERM CURRENT USE OF INSULIN (HCC): Primary | ICD-10-CM

## 2021-12-14 LAB
EXPIRATION DATE: ABNORMAL
GLUCOSE BLDC GLUCOMTR-MCNC: 164 MG/DL (ref 70–130)
HBA1C MFR BLD: 7.3 %
Lab: ABNORMAL

## 2021-12-14 PROCEDURE — 83036 HEMOGLOBIN GLYCOSYLATED A1C: CPT | Performed by: NURSE PRACTITIONER

## 2021-12-14 PROCEDURE — 82962 GLUCOSE BLOOD TEST: CPT | Performed by: NURSE PRACTITIONER

## 2021-12-14 PROCEDURE — 99213 OFFICE O/P EST LOW 20 MIN: CPT | Performed by: NURSE PRACTITIONER

## 2021-12-14 PROCEDURE — 3051F HG A1C>EQUAL 7.0%<8.0%: CPT | Performed by: NURSE PRACTITIONER

## 2021-12-14 RX ORDER — DULAGLUTIDE 0.75 MG/.5ML
0.75 INJECTION, SOLUTION SUBCUTANEOUS WEEKLY
Qty: 2 ML | Refills: 5 | Status: SHIPPED | OUTPATIENT
Start: 2021-12-14 | End: 2022-03-23 | Stop reason: DRUGHIGH

## 2021-12-14 NOTE — PROGRESS NOTES
Specialty Pharmacy Patient Management Program  Endocrinology Reassessment     Michelle Estrada is a 51 y.o. female with Type 2 Diabetes seen by an Endocrinology provider and enrolled in the Endocrinology Patient Management program offered by [facility].  A follow-up outreach was conducted, including assessment of continued therapy appropriateness, medication adherence, and side effect incidence and management for Trulicity.     Patient is also taking insulin and metformin to manage her BGs. She reports tolerating her regimen very well. Her last A1C was 10.3% and is now down to 7.3%. She reports some hypoglycemia (one in the 60s). She feels shaky when its around 100.        Changes to Insurance Coverage or Financial Support  None     Relevant Past Medical History and Comorbidities  Relevant medical history and concomitant health conditions were discussed with the patient. The patient's chart has been reviewed for relevant past medical history and comorbid health conditions and updated as necessary.   Past Medical History:   Diagnosis Date   • Anxiety    • Bipolar 1 disorder (HCC)    • COPD (chronic obstructive pulmonary disease) (HCC)    • Depression    • Diabetes mellitus (HCC)    • Fibromyalgia    • Neuropathy    • PTSD (post-traumatic stress disorder)      Social History     Socioeconomic History   • Marital status: Single   Tobacco Use   • Smoking status: Current Every Day Smoker   Substance and Sexual Activity   • Alcohol use: Never   • Drug use: Not Currently     Comment: recovering   • Sexual activity: Never       Allergies  Known allergies and reactions were discussed with the patient. The patient's chart has been reviewed for allergy information and updated as necessary.   Dilantin [phenytoin] and Lisinopril    Relevant Laboratory Values  A1C Last 3 Results    HGBA1C Last 3 Results 8/10/21 12/14/21   Hemoglobin A1C 10.30 (A) 7.3   (A) Abnormal value            Lab Results   Component  Value Date    HGBA1C 7.3 12/14/2021     Lab Results   Component Value Date    GLUCOSE 473 (C) 08/10/2021    CALCIUM 9.4 08/10/2021     (L) 08/10/2021    K 4.6 08/10/2021    CO2 29.0 08/10/2021    CL 98 08/10/2021    BUN 12 08/10/2021    CREATININE 0.99 08/10/2021    EGFRIFNONA 59 (L) 08/10/2021    BCR 12.1 08/10/2021    ANIONGAP 7.0 08/10/2021     No results found for: CHOL, CHLPL, TRIG, HDL, LDL, LDLDIRECT      Current Medication List  This medication list has been reviewed with the patient and evaluated for any interactions or necessary modifications/recommendations, and updated to include all prescription medications, OTC medications, and supplements the patient is currently taking.  This list reflects what is contained in the patient's profile, which has also been marked as reviewed to communicate to other providers it is the most up to date version of the patient's current medication therapy.     Current Outpatient Medications:   •  B-D ULTRAFINE III SHORT PEN 31G X 8 MM misc, , Disp: , Rfl:   •  Continuous Blood Gluc  (FreeStyle Wilda 2 [place]) device, Use 1 kit  As Needed (Use with sensor)., Disp: 1 each, Rfl: 0  •  Continuous Blood Gluc Sensor (FreeStyle Wilda 2 Sensor) misc, Use 1 sensor Every 14 (Fourteen) Days., Disp: 2 each, Rfl: 5  •  Dulaglutide (Trulicity) 0.75 MG/0.5ML solution pen-injector, Inject 0.75 mg under the skin into the appropriate area as directed 1 (One) Time Per Week., Disp: 2 mL, Rfl: 5  •  escitalopram (LEXAPRO) 10 MG tablet, Take 1 tablet by mouth Daily., Disp: 90 tablet, Rfl: 0  •  fenofibrate (TRICOR) 145 MG tablet, Take 1 tablet by mouth Daily., Disp: 90 tablet, Rfl: 0  •  hydrOXYzine (ATARAX) 10 MG tablet, TAKE 1 TABLET BY MOUTH 3 TIMES A DAY AS NEED FOR ANXIETY & SLEEP, MAY TAKE 2 AT BEDTIME IF NEEDED, Disp: , Rfl:   •  insulin aspart (NovoLOG FlexPen) 100 UNIT/ML solution pen-injector sc pen, Inject 35 Units under the skin into the appropriate area as directed 3  (Three) Times a Day With Meals., Disp: 30 mL, Rfl: 5  •  Insulin Glargine, 2 Unit Dial, (Toujeo Max SoloStar) 300 UNIT/ML solution pen-injector injection, Inject 75 Units under the skin into the appropriate area as directed every night at bedtime., Disp: 9 mL, Rfl: 5  •  lamoTRIgine (LaMICtal) 25 MG tablet, Take 2 tablets by mouth 2 (two) times a day., Disp: 360 tablet, Rfl: 0  •  metFORMIN ER (GLUCOPHAGE-XR) 500 MG 24 hr tablet, Take 1 tablet by mouth every night at bedtime., Disp: 30 tablet, Rfl: 5  •  rosuvastatin (CRESTOR) 20 MG tablet, Take 1 tablet by mouth Daily., Disp: 30 tablet, Rfl: 5  •  hydrOXYzine (ATARAX) 25 MG tablet, Take 1 tablet by mouth 3 (Three) Times a Day As Needed for Anxiety/Sleep., Disp: 90 tablet, Rfl: 0    Drug Interactions  None     Adverse Drug Reactions  • Adverse Reactions Experienced: None       Adherence and Self-Administration  • Approximate Number of Doses Missed Since Last Assessment: 1 dose of Trulicity   • Methods for Supporting Patient Adherence and/or Self-Administration: Sets alarm on her phone now    Goals of Therapy:  Progress Toward Meeting Clinical Goals or Therapeutic Targets: great reduction in A1C       Reassessment Plan & Follow-Up  1. Medication Therapy Changes: None  2. Additional Plans, Therapy Recommendations, or Therapy Problems to Be Addressed: Can titrate Trulicity up in the future  3. Pharmacist to perform regular reassessments no more than (6) months from the previous assessment.  4. Care Coordinator to set up future refill outreaches, coordinate prescription delivery, and escalate clinical questions to pharmacist.     Attestation  I attest that the specialty medication(s) addressed above are appropriate for the patient based on my reassessment.  If the prescribed therapy is at any point deemed not appropriate based on the current or future assessments, a consultation will be initiated with the patient's specialty care provider to determine the best course of  action. The revised plan of therapy will be documented along with any additional patient education provided.     Chinyere Dyson, PharmD   12/14/2021  11:16 EST

## 2021-12-14 NOTE — PATIENT INSTRUCTIONS
-Continue using Freestyle Wilda CGM.  -Toujeo 74 units nightly.  -Novolog 35 units with breakfast and lunch, 32 units with dinner.  -Metformin 500 mg QD.  -Trulicity 0.75 mg weekly.

## 2021-12-14 NOTE — ASSESSMENT & PLAN NOTE
-Diabetes is improving with A1C 7.3 down from 10.3.  -Continue Toujeo 74 units qhs.  -Continue Metformin 500 mg QD.  -Continue Novolog 35 units with breakfast and lunch, 32 units with dinner.  -Continue Trulicity 0.75 mg weekly.  -Continue using Freestyle Wilda CGM.  -S/S hypoglycemia reviewed with Rule of 15's advised.  -Discussed with patient that since her A1C and BG's are much improved, she may start to notice more frequent hypoglycemia episodes.  If these are occurring in the morning (fasting) she was instructed how to adjust her Toujeo accordingly to maintain fasting BG's .  If they are occurring 2-hour PPD, she was instructed to decrease her Novolog by 2 units and then notify provider for additional instructions.  -Continue with improvements that have been made to diet and exercise regimen.  -Schedule a diabetic eye exam.  -Follow-up in 3 months.  Will need fasting labs.

## 2021-12-14 NOTE — PROGRESS NOTES
"Chief Complaint   Patient presents with   • Diabetes     follow up          HPI   Michelle Estrada is a 51 y.o. female had concerns including Diabetes (follow up).      She has improved her diet and moving around more as tolerated.  She has a new dog that keeps her on the move.  Her mother recently passed away in October.  Since then she has been crafting and sewing and keeping herself busy and has lessened her boredom eating.      She is checking blood sugars with Freestyle Wilda, when she does not wear it she checks it 4-5 times per day.  Current medications for diabetes include Trulicity 0.75 mg weekly-tolerating this well, Novolog 35 units with meals, Toujeo 74-76 units qhs, Metformin 500 mg QD.  Hypos: occasionally, has been more frequent, has been in the 60's-80's, which are mostly late evening or overnight.  Last optho exam: 2-3 years ago    The following portions of the patient's history were reviewed and updated as appropriate: allergies, current medications, past family history, past medical history, past social history, past surgical history and problem list.      Review of Systems   Constitutional: Negative for fatigue.   Endocrine: Positive for polydipsia. Negative for polyphagia and polyuria.   All other systems reviewed and are negative.       Physical Exam  Vitals reviewed.   Constitutional:       Appearance: Normal appearance.   Cardiovascular:      Rate and Rhythm: Normal rate.   Pulmonary:      Effort: Pulmonary effort is normal.   Neurological:      Mental Status: She is alert and oriented to person, place, and time.   Psychiatric:         Mood and Affect: Mood normal.         Behavior: Behavior normal.         Thought Content: Thought content normal.         Judgment: Judgment normal.        /69 (BP Location: Left arm, Patient Position: Sitting, Cuff Size: Adult)   Pulse 94   Temp 98.5 °F (36.9 °C) (Oral)   Ht 162.6 cm (64\")   Wt 116 kg (256 lb)   LMP 12/14/2020   SpO2 94%   " Breastfeeding No   BMI 43.94 kg/m²      Labs and imaging    CMP:  Lab Results   Component Value Date    BUN 12 08/10/2021    CREATININE 0.99 08/10/2021    EGFRIFNONA 59 (L) 08/10/2021    BCR 12.1 08/10/2021     (L) 08/10/2021    K 4.6 08/10/2021    CO2 29.0 08/10/2021    CALCIUM 9.4 08/10/2021    ALBUMIN 4.10 11/29/2020    BILITOT 0.7 11/29/2020    ALKPHOS 135 (H) 11/29/2020    AST 12 11/29/2020    ALT <5 11/29/2020     Lipid Panel:  No results found for: CHOL, TRIG, HDL, VLDL, LDL  HbA1c:  Lab Results   Component Value Date    HGBA1C 7.3 12/14/2021    HGBA1C 10.30 (H) 08/10/2021     Glucose:    Lab Results   Component Value Date    POCGLU ([facility]) 444+ 02/21/2021     Microalbumin:  No results found for: MALBCRERATIO  TSH:  No results found for: TSH    Assessment and plan  Diagnoses and all orders for this visit:    1. Type 2 diabetes mellitus with hyperglycemia, with long-term current use of insulin (HCC) (Primary)  Assessment & Plan:  -Diabetes is improving with A1C 7.3 down from 10.3.  -Continue Toujeo 74 units qhs.  -Continue Metformin 500 mg QD.  -Continue Novolog 35 units with breakfast and lunch, 32 units with dinner.  -Continue Trulicity 0.75 mg weekly.  -Continue using Freestyle Wilda CGM.  -S/S hypoglycemia reviewed with Rule of 15's advised.  -Discussed with patient that since her A1C and BG's are much improved, she may start to notice more frequent hypoglycemia episodes.  If these are occurring in the morning (fasting) she was instructed how to adjust her Toujeo accordingly to maintain fasting BG's .  If they are occurring 2-hour PPD, she was instructed to decrease her Novolog by 2 units and then notify provider for additional instructions.  -Continue with improvements that have been made to diet and exercise regimen.  -Schedule a diabetic eye exam.  -Follow-up in 3 months.  Will need fasting labs.        Orders:  -     POC Glycosylated Hemoglobin (Hb A1C)       Return in about 3 months (around  3/14/2022) for Follow-up appointment, A1C. The patient was instructed to contact the clinic with any interval questions or concerns.    JIN Jara   Endocrinology

## 2021-12-29 ENCOUNTER — SPECIALTY PHARMACY (OUTPATIENT)
Dept: PHARMACY | Facility: HOSPITAL | Age: 51
End: 2021-12-29

## 2021-12-29 NOTE — PROGRESS NOTES
Specialty Pharmacy from 11/11/2021 in [facility] with Chinyere Dyson RPH Specialty Pharmacy from 12/7/2021 in [facility] with Chniyere Dyson RPH Specialty Pharmacy from 12/29/2021 in [facility] with Chinyere Dyson RPH     11/11/21 12/7/21 12/29/21    1428   0904   0948     Refill Coordination     Changes to allergies? No No No   Changes to medications? No No No   New conditions since last clinic visit No No No   Unplanned office visit, urgent care, ED, or hospital admission in the last 4 weeks  No No No   How does patient/caregiver feel medication is working? Good Good Good   Financial problems or insurance changes  No No No   How many doses of your specialty medications were missed in the last 4 weeks? 2-3 0 --   Why were doses missed? Just forgot N/A

## 2022-01-10 ENCOUNTER — SPECIALTY PHARMACY (OUTPATIENT)
Dept: PHARMACY | Facility: HOSPITAL | Age: 52
End: 2022-01-10

## 2022-01-24 ENCOUNTER — SPECIALTY PHARMACY (OUTPATIENT)
Dept: PHARMACY | Facility: HOSPITAL | Age: 52
End: 2022-01-24

## 2022-01-25 ENCOUNTER — SPECIALTY PHARMACY (OUTPATIENT)
Dept: PHARMACY | Facility: HOSPITAL | Age: 52
End: 2022-01-25

## 2022-02-15 ENCOUNTER — SPECIALTY PHARMACY (OUTPATIENT)
Dept: PHARMACY | Facility: HOSPITAL | Age: 52
End: 2022-02-15

## 2022-02-15 RX ORDER — BUDESONIDE AND FORMOTEROL FUMARATE DIHYDRATE 160; 4.5 UG/1; UG/1
2 AEROSOL RESPIRATORY (INHALATION)
COMMUNITY

## 2022-02-15 NOTE — PROGRESS NOTES
Patient has been in hospital for UTI/infection. She is ready for her medications to be refilled. Added Symbicort to medication list, but denies that any other medications were added after discharge.  Additionally, patient's sonia was covered, but is not under her prescription insurance. Will check for further coverage.

## 2022-02-25 ENCOUNTER — SPECIALTY PHARMACY (OUTPATIENT)
Dept: PHARMACY | Facility: HOSPITAL | Age: 52
End: 2022-02-25

## 2022-02-25 NOTE — PROGRESS NOTES
Specialty Pharmacy Refill Coordination Note      Name:  Michelle Estrada  :  1970  Date:  2022         Past Medical History:   Diagnosis Date   • Anxiety    • Bipolar 1 disorder (HCC)    • COPD (chronic obstructive pulmonary disease) (HCC)    • Depression    • Diabetes mellitus (HCC)    • Fibromyalgia    • Neuropathy    • PTSD (post-traumatic stress disorder)        Past Surgical History:   Procedure Laterality Date   • CHOLECYSTECTOMY     • CYST REMOVAL     • KIDNEY STONE SURGERY     • TUBAL ABDOMINAL LIGATION         Social History     Socioeconomic History   • Marital status: Single   Tobacco Use   • Smoking status: Current Every Day Smoker   Substance and Sexual Activity   • Alcohol use: Never   • Drug use: Not Currently     Comment: recovering   • Sexual activity: Never       No family history on file.    Allergies   Allergen Reactions   • Dilantin [Phenytoin] Hives   • Lisinopril Cough       Current Outpatient Medications   Medication Sig Dispense Refill   • B-D ULTRAFINE III SHORT PEN 31G X 8 MM misc      • budesonide-formoterol (SYMBICORT) 160-4.5 MCG/ACT inhaler Inhale 2 puffs 2 (Two) Times a Day.     • Continuous Blood Gluc  (FreeStyle Wilda 2 [place]) device Use 1 kit  As Needed (Use with sensor). 1 each 0   • Continuous Blood Gluc Sensor (FreeStyle Wilda 2 Sensor) misc Use 1 sensor Every 14 (Fourteen) Days. 2 each 5   • Dulaglutide (Trulicity) 0.75 MG/0.5ML solution pen-injector Inject 0.75 mg under the skin into the appropriate area as directed 1 (One) Time Per Week. 2 mL 5   • escitalopram (LEXAPRO) 10 MG tablet Take 1 tablet by mouth Daily. 90 tablet 0   • fenofibrate (TRICOR) 145 MG tablet Take 1 tablet by mouth Daily. 90 tablet 0   • hydrOXYzine (ATARAX) 10 MG tablet TAKE 1 TABLET BY MOUTH 3 TIMES A DAY AS NEED FOR ANXIETY & SLEEP, MAY TAKE 2 AT BEDTIME IF NEEDED     • hydrOXYzine (ATARAX) 25 MG tablet Take 1 tablet by mouth 3 (Three) Times a Day As Needed for Anxiety/Sleep. 90  tablet 0   • insulin aspart (NovoLOG FlexPen) 100 UNIT/ML solution pen-injector sc pen Inject 35 Units under the skin into the appropriate area as directed 3 (Three) Times a Day With Meals. 30 mL 5   • Insulin Glargine, 2 Unit Dial, (Toujeo Max SoloStar) 300 UNIT/ML solution pen-injector injection Inject 75 Units under the skin into the appropriate area as directed every night at bedtime. 9 mL 5   • lamoTRIgine (LaMICtal) 25 MG tablet Take 2 tablets by mouth 2 (two) times a day. 360 tablet 0   • magnesium oxide (MAGOX) 400 (241.3 Mg) MG tablet tablet Take 400 mg by mouth Daily.     • metFORMIN ER (GLUCOPHAGE-XR) 500 MG 24 hr tablet Take 1 tablet by mouth every night at bedtime. 30 tablet 5   • rosuvastatin (CRESTOR) 20 MG tablet Take 1 tablet by mouth Daily. 30 tablet 5     No current facility-administered medications for this visit.         ASSESSMENT/PLAN:      Michelle is a 51 y.o. female contacted today regarding refills of  FreeStyle Wilda 2 Sensor medication(s).    Reviewed and verified with patient:       Specialty medication(s) and dose(s) confirmed: yes    Refill Questions      Most Recent Value   Changes to allergies? No   Changes to medications? No   New conditions since last clinic visit No   Unplanned office visit, urgent care, ED, or hospital admission in the last 4 weeks  No   How does patient/caregiver feel medication is working? Very good   Financial problems or insurance changes  No   If yes, describe changes in insurance or financial issues. None   How many doses of your specialty medications were missed in the last 4 weeks? 0   Why were doses missed? NA   Does this patient require a clinical escalation to a pharmacist? No                     Follow-up:14 day(s)     Niharika Burciaga, Pharmacy Technician  Specialty Pharmacy Technician                [FreeTextEntry1] : EKG-NSR, WNL

## 2022-03-04 ENCOUNTER — SPECIALTY PHARMACY (OUTPATIENT)
Dept: PHARMACY | Facility: HOSPITAL | Age: 52
End: 2022-03-04

## 2022-03-11 ENCOUNTER — SPECIALTY PHARMACY (OUTPATIENT)
Dept: PHARMACY | Facility: HOSPITAL | Age: 52
End: 2022-03-11

## 2022-03-11 RX ORDER — MONTELUKAST SODIUM 10 MG/1
10 TABLET ORAL
COMMUNITY

## 2022-03-16 ENCOUNTER — SPECIALTY PHARMACY (OUTPATIENT)
Dept: PHARMACY | Facility: HOSPITAL | Age: 52
End: 2022-03-16

## 2022-03-23 ENCOUNTER — OFFICE VISIT (OUTPATIENT)
Dept: ENDOCRINOLOGY | Facility: CLINIC | Age: 52
End: 2022-03-23

## 2022-03-23 ENCOUNTER — SPECIALTY PHARMACY (OUTPATIENT)
Dept: PHARMACY | Facility: HOSPITAL | Age: 52
End: 2022-03-23

## 2022-03-23 VITALS
OXYGEN SATURATION: 96 % | HEART RATE: 86 BPM | HEIGHT: 64 IN | DIASTOLIC BLOOD PRESSURE: 87 MMHG | TEMPERATURE: 97.2 F | BODY MASS INDEX: 43.36 KG/M2 | WEIGHT: 254 LBS | SYSTOLIC BLOOD PRESSURE: 137 MMHG

## 2022-03-23 DIAGNOSIS — Z79.4 TYPE 2 DIABETES MELLITUS WITH HYPERGLYCEMIA, WITH LONG-TERM CURRENT USE OF INSULIN: Primary | ICD-10-CM

## 2022-03-23 DIAGNOSIS — E11.65 TYPE 2 DIABETES MELLITUS WITH HYPERGLYCEMIA, WITH LONG-TERM CURRENT USE OF INSULIN: Primary | ICD-10-CM

## 2022-03-23 LAB
EXPIRATION DATE: ABNORMAL
GLUCOSE BLDC GLUCOMTR-MCNC: 168 MG/DL (ref 70–130)
HBA1C MFR BLD: 6.3 %
Lab: ABNORMAL

## 2022-03-23 PROCEDURE — 83036 HEMOGLOBIN GLYCOSYLATED A1C: CPT | Performed by: NURSE PRACTITIONER

## 2022-03-23 PROCEDURE — 3044F HG A1C LEVEL LT 7.0%: CPT | Performed by: NURSE PRACTITIONER

## 2022-03-23 PROCEDURE — 82962 GLUCOSE BLOOD TEST: CPT | Performed by: NURSE PRACTITIONER

## 2022-03-23 PROCEDURE — 99213 OFFICE O/P EST LOW 20 MIN: CPT | Performed by: NURSE PRACTITIONER

## 2022-03-23 RX ORDER — DULAGLUTIDE 1.5 MG/.5ML
1.5 INJECTION, SOLUTION SUBCUTANEOUS WEEKLY
Qty: 2 ML | Refills: 5 | Status: SHIPPED | OUTPATIENT
Start: 2022-03-23 | End: 2022-06-29

## 2022-03-23 NOTE — PROGRESS NOTES
"Chief Complaint   Patient presents with   • Diabetes     Follow up.      Michelle Estrada is a 51 y.o. female had concerns including Diabetes (Follow up.).      She has improved her diet and moving around more as tolerated.  She has a new dog that keeps her on the move.  She had made improvements to her diet and exercise.    She is checking blood sugars with Freestyle Wilda, when she does not wear it she checks it 4-5 times per day.  Current medications for diabetes include Trulicity 0.75 mg weekly-tolerating this well, Novolog 20-35 units with meals, Toujeo 74 units qhs, Metformin 500 mg QD.  Hypos: occasionally, 1-2x weekly.  Last optho exam: scheduling soon    The following portions of the patient's history were reviewed and updated as appropriate: allergies, current medications, past family history, past medical history, past social history, past surgical history and problem list.      Review of Systems   Constitutional: Negative for fatigue.   Endocrine: Positive for polydipsia and polyphagia. Negative for polyuria.   Psychiatric/Behavioral: Positive for sleep disturbance.   All other systems reviewed and are negative.       Physical Exam  Vitals reviewed.   Constitutional:       Appearance: Normal appearance.   Cardiovascular:      Rate and Rhythm: Normal rate.   Pulmonary:      Effort: Pulmonary effort is normal.   Neurological:      Mental Status: She is alert and oriented to person, place, and time.   Psychiatric:         Mood and Affect: Mood normal.         Behavior: Behavior normal.         Thought Content: Thought content normal.         Judgment: Judgment normal.        /87 (BP Location: Left arm, Patient Position: Sitting, Cuff Size: Adult)   Pulse 86   Temp 97.2 °F (36.2 °C) (Infrared)   Ht 162.6 cm (64\")   Wt 115 kg (254 lb)   SpO2 96%   Breastfeeding No   BMI 43.60 kg/m²      Labs and imaging    CMP:  Lab Results   Component Value Date    BUN 12 08/10/2021    CREATININE 0.99 08/10/2021 "    EGFRIFNONA 59 (L) 08/10/2021    BCR 12.1 08/10/2021     (L) 08/10/2021    K 4.6 08/10/2021    CO2 29.0 08/10/2021    CALCIUM 9.4 08/10/2021    ALBUMIN 4.10 11/29/2020    BILITOT 0.7 11/29/2020    ALKPHOS 135 (H) 11/29/2020    AST 12 11/29/2020    ALT <5 11/29/2020     Lipid Panel:  No results found for: CHOL, TRIG, HDL, VLDL, LDL  HbA1c:  Lab Results   Component Value Date    HGBA1C 6.3 03/23/2022    HGBA1C 7.3 12/14/2021     Glucose:    Lab Results   Component Value Date    POCGLU 168 (A) 03/23/2022     Microalbumin:  No results found for: MALBCRERATIO  TSH:  No results found for: TSH    Assessment and plan  Diagnoses and all orders for this visit:    1. Type 2 diabetes mellitus with hyperglycemia, with long-term current use of insulin (HCC) (Primary)  Assessment & Plan:  -Diabetes is improving with A1C 6.3 down from 7.3.  -Continue Toujeo 74 units qhs.  -Continue Metformin 500 mg QD.  -Continue Novolog 20-35 units TID with meals.  -Increase Trulicity to 1.5 mg weekly.  -Continue using Freestyle Wilda CGM.  -S/S hypoglycemia reviewed with Rule of 15's advised.  -Discussed with patient that since her A1C and BG's are much improved, she may start to notice more frequent hypoglycemia episodes.  If these are occurring in the morning (fasting) she was instructed how to adjust her Toujeo accordingly to maintain fasting BG's .  If they are occurring 2-hour PPD, she was instructed to decrease her Novolog by 2 units and then notify provider for additional instructions.  -Continue with improvements that have been made to diet and exercise regimen.  -Schedule a diabetic eye exam.  -Follow-up in 3 months.  Will need fasting labs.    Orders:  -     POC Glycosylated Hemoglobin (Hb A1C)  -     POC Glucose Fingerstick       Return in about 3 months (around 6/23/2022) for A1C, Follow-up appointment. The patient was instructed to contact the clinic with any interval questions or concerns.    JIN Jara    Endocrinology

## 2022-03-23 NOTE — PROGRESS NOTES
Specialty Pharmacy Patient Management Program  Endocrinology Reassessment     Michelle Estrada is a 51 y.o. female with Type 2 Diabetes seen by an Endocrinology provider and enrolled in the Endocrinology Patient Management program offered by [facility].  A follow-up outreach was conducted, including assessment of continued therapy appropriateness, medication adherence, and side effect incidence and management for  Trulicity, insulin therapy, metformin, FreeStyle Wilda.     Patient is doing well with current regimen. She reports no side effects. She is using FreeStyle Wilda to monitor BG and reports a few lows less than 70 since last visit.     Changes to Insurance Coverage or Financial Support  None    Relevant Past Medical History and Comorbidities  Relevant medical history and concomitant health conditions were discussed with the patient. The patient's chart has been reviewed for relevant past medical history and comorbid health conditions and updated as necessary.   Past Medical History:   Diagnosis Date   • Anxiety    • Bipolar 1 disorder (HCC)    • COPD (chronic obstructive pulmonary disease) (HCC)    • Depression    • Diabetes mellitus (HCC)    • Fibromyalgia    • Neuropathy    • PTSD (post-traumatic stress disorder)      Social History     Socioeconomic History   • Marital status: Single   Tobacco Use   • Smoking status: Current Every Day Smoker   Substance and Sexual Activity   • Alcohol use: Never   • Drug use: Not Currently     Comment: recovering   • Sexual activity: Never            Allergies  Known allergies and reactions were discussed with the patient. The patient's chart has been reviewed for allergy information and updated as necessary.   Dilantin [phenytoin] and Lisinopril         Relevant Laboratory Values  A1C Last 3 Results    HGBA1C Last 3 Results 8/10/21 12/14/21 3/23/22   Hemoglobin A1C 10.30 (A) 7.3 6.3   (A) Abnormal value            Lab Results   Component Value  Date    HGBA1C 6.3 03/23/2022     Lab Results   Component Value Date    GLUCOSE 473 (C) 08/10/2021    CALCIUM 9.4 08/10/2021     (L) 08/10/2021    K 4.6 08/10/2021    CO2 29.0 08/10/2021    CL 98 08/10/2021    BUN 12 08/10/2021    CREATININE 0.99 08/10/2021    EGFRIFNONA 59 (L) 08/10/2021    BCR 12.1 08/10/2021    ANIONGAP 7.0 08/10/2021     No results found for: CHOL, CHLPL, TRIG, HDL, LDL, LDLDIRECT      Current Medication List  This medication list has been reviewed with the patient and evaluated for any interactions or necessary modifications/recommendations, and updated to include all prescription medications, OTC medications, and supplements the patient is currently taking.  This list reflects what is contained in the patient's profile, which has also been marked as reviewed to communicate to other providers it is the most up to date version of the patient's current medication therapy.     Current Outpatient Medications:   •  B-D ULTRAFINE III SHORT PEN 31G X 8 MM misc, , Disp: , Rfl:   •  budesonide-formoterol (SYMBICORT) 160-4.5 MCG/ACT inhaler, Inhale 2 puffs 2 (Two) Times a Day. PRN, Disp: , Rfl:   •  Continuous Blood Gluc  (FreeStyle Wilda 2 [place]) device, Use 1 kit  As Needed (Use with sensor)., Disp: 1 each, Rfl: 0  •  Continuous Blood Gluc Sensor (FreeStyle Wilda 2 Sensor) misc, Use 1 sensor Every 14 (Fourteen) Days., Disp: 2 each, Rfl: 5  •  Dulaglutide (Trulicity) 0.75 MG/0.5ML solution pen-injector, Inject 0.75 mg under the skin into the appropriate area as directed 1 (One) Time Per Week., Disp: 2 mL, Rfl: 5  •  escitalopram (LEXAPRO) 10 MG tablet, Take 1 tablet by mouth Daily., Disp: 90 tablet, Rfl: 0  •  fenofibrate (TRICOR) 145 MG tablet, Take 1 tablet by mouth Daily., Disp: 90 tablet, Rfl: 0  •  hydrOXYzine (ATARAX) 10 MG tablet, Daily As Needed., Disp: , Rfl:   •  insulin aspart (NovoLOG FlexPen) 100 UNIT/ML solution pen-injector sc pen, Inject 35 Units under the skin into the  appropriate area as directed 3 (Three) Times a Day With Meals., Disp: 30 mL, Rfl: 5  •  Insulin Glargine, 2 Unit Dial, (Toujeo Max SoloStar) 300 UNIT/ML solution pen-injector injection, Inject 75 Units under the skin into the appropriate area as directed every night at bedtime., Disp: 9 mL, Rfl: 5  •  lamoTRIgine (LaMICtal) 25 MG tablet, Take 2 tablets by mouth 2 (two) times a day., Disp: 360 tablet, Rfl: 0  •  magnesium oxide (MAGOX) 400 (241.3 Mg) MG tablet tablet, Take 400 mg by mouth Daily., Disp: , Rfl:   •  metFORMIN ER (GLUCOPHAGE-XR) 500 MG 24 hr tablet, Take 1 tablet by mouth every night at bedtime., Disp: 30 tablet, Rfl: 5  •  montelukast (SINGULAIR) 10 MG tablet, Take 10 mg by mouth., Disp: , Rfl:   •  rosuvastatin (CRESTOR) 20 MG tablet, Take 1 tablet by mouth Daily., Disp: 30 tablet, Rfl: 5  •  hydrOXYzine (ATARAX) 25 MG tablet, Take 1 tablet by mouth 3 (Three) Times a Day As Needed for Anxiety/Sleep., Disp: 90 tablet, Rfl: 0    Medicines reviewed by Tamara Meadows [facility] on 3/23/2022 at  3:59 PM    Drug Interactions  None    Adverse Drug Reactions  • Adverse Reactions Experienced: None  • Plan for ADR Management: Not required    Hospitalizations and Urgent Care Since Last Assessment  • Hospitalizations or Admissions: None  • ED Visits: None  • Urgent Office Visits: None    Adherence and Self-Administration  Adherence related patient's specialty therapy was discussed with the patient. The Adherence segment of this outreach has been reviewed and updated.     • Ongoing or New Barriers to Patient Adherence and/or Self-Administration:None  • Methods for Supporting Patient Adherence and/or Self-Administration: None required     Goals of Therapy  Goals related to the patient's specialty therapy was discussed with the patient. The Patient Goals segment of this outreach has been reviewed and updated.    Goals     • HEMOGLOBIN A1C < 7              Reassessment Plan & Follow-Up  1. Medication Therapy Changes: None  addressed with patient  2. Additional Plans, Therapy Recommendations, or Therapy Problems to Be Addressed: Her A1C is down to 6.3% today from 7.3% at last visit. Patient would benefit from dose titration of Trulicity for further A1C reduction.   3. Pharmacist to perform regular reassessments no more than (6) months from the previous assessment.  4. Care Coordinator to set up future refill outreaches, coordinate prescription delivery, and escalate clinical questions to pharmacist.     Attestation  I attest that the specialty medication(s) addressed above are appropriate for the patient based on my reassessment.  If the prescribed therapy is at any point deemed not appropriate based on the current or future assessments, a consultation will be initiated with the patient's specialty care provider to determine the best course of action. The revised plan of therapy will be documented along with any additional patient education provided.     Tamara Meadows, PharmD  3/23/2022  16:08 EDT

## 2022-03-23 NOTE — ASSESSMENT & PLAN NOTE
-Diabetes is improving with A1C 6.3 down from 7.3.  -Continue Toujeo 74 units qhs.  -Continue Metformin 500 mg QD.  -Continue Novolog 20-35 units TID with meals.  -Increase Trulicity to 1.5 mg weekly.  -Continue using Freestyle Wilda CGM.  -S/S hypoglycemia reviewed with Rule of 15's advised.  -Discussed with patient that since her A1C and BG's are much improved, she may start to notice more frequent hypoglycemia episodes.  If these are occurring in the morning (fasting) she was instructed how to adjust her Toujeo accordingly to maintain fasting BG's .  If they are occurring 2-hour PPD, she was instructed to decrease her Novolog by 2 units and then notify provider for additional instructions.  -Continue with improvements that have been made to diet and exercise regimen.  -Schedule a diabetic eye exam.  -Follow-up in 3 months.  Will need fasting labs.

## 2022-03-23 NOTE — PATIENT INSTRUCTIONS
-Increase Trulicity to 1.5 mg weekly.  -Decrease Toujeo to 70 units at night.  -Novolog 20-35 units with meals.      If you fasting blood sugars are lower than 90 decrease Toujeo by 2 units every 3 days until fasting is .  If you 2-hour post meal blood sugars are below 180, then decrease your  meal time insulin.

## 2022-04-01 ENCOUNTER — SPECIALTY PHARMACY (OUTPATIENT)
Dept: PHARMACY | Facility: HOSPITAL | Age: 52
End: 2022-04-01

## 2022-04-18 ENCOUNTER — SPECIALTY PHARMACY (OUTPATIENT)
Dept: PHARMACY | Facility: HOSPITAL | Age: 52
End: 2022-04-18

## 2022-04-18 NOTE — PROGRESS NOTES
Patient has asked if provider will switch her to Dexcom. She isn't happy with FreeStyle Wilda. She states that her BG readings are off about 50 points from her finger stick values. Patient has Medicare, will update provider and have her send prescription through DME pharmacy for coverage.

## 2022-04-19 RX ORDER — PROCHLORPERAZINE 25 MG/1
1 SUPPOSITORY RECTAL
Qty: 1 EACH | Refills: 3 | Status: SHIPPED | OUTPATIENT
Start: 2022-04-19

## 2022-04-19 RX ORDER — PROCHLORPERAZINE 25 MG/1
1 SUPPOSITORY RECTAL CONTINUOUS
Qty: 1 EACH | Refills: 0 | Status: SHIPPED | OUTPATIENT
Start: 2022-04-19

## 2022-04-19 RX ORDER — PROCHLORPERAZINE 25 MG/1
SUPPOSITORY RECTAL
Qty: 3 EACH | Refills: 11 | Status: SHIPPED | OUTPATIENT
Start: 2022-04-19

## 2022-04-27 ENCOUNTER — SPECIALTY PHARMACY (OUTPATIENT)
Dept: PHARMACY | Facility: HOSPITAL | Age: 52
End: 2022-04-27

## 2022-05-04 ENCOUNTER — SPECIALTY PHARMACY (OUTPATIENT)
Dept: PHARMACY | Facility: HOSPITAL | Age: 52
End: 2022-05-04

## 2022-05-04 NOTE — PROGRESS NOTES
Specialty Pharmacy Patient Management Program  Endocrinology Reassessment     Mihcelle Estrada is a 51 y.o. female with Type 2 Diabetes enrolled in the Endocrinology Patient Management program offered by [facility].  A follow-up was conducted, including assessment of continued therapy appropriateness, medication adherence, and side effect incidence and management for  Trulicity, insulin therapy, and metformin..     She takes Trulicity 1.5 mg weekly, Novolog 20-35 units with meals depending on BG reading, Toujeo 62-64 units at night, and metformin 500 mg daily. Patient is doing well with current regimen. She reports no side effects. She is switching to Dexcom and  will receive her supplies in the mail this week from [facility] pharmacy. Encouraged her to come back to clinic for initial set-up if she has questions.     Changes to Insurance Coverage or Financial Support  None    Relevant Past Medical History and Comorbidities  Relevant medical history and concomitant health conditions were discussed with the patient. The patient's chart has been reviewed for relevant past medical history and comorbid health conditions and updated as necessary.   Past Medical History:   Diagnosis Date   • Anxiety    • Bipolar 1 disorder (HCC)    • COPD (chronic obstructive pulmonary disease) (HCC)    • Depression    • Diabetes mellitus (HCC)    • Fibromyalgia    • Neuropathy    • PTSD (post-traumatic stress disorder)      Social History     Socioeconomic History   • Marital status: Single   Tobacco Use   • Smoking status: Current Every Day Smoker   Substance and Sexual Activity   • Alcohol use: Never   • Drug use: Not Currently     Comment: recovering   • Sexual activity: Never       Problem list reviewed by Tamara Meadows RP on 5/4/2022 at 11:29 AM    Allergies  Known allergies and reactions were discussed with the patient. The patient's chart has been reviewed for allergy information and updated as necessary.    Dilantin [phenytoin] and Lisinopril    Allergies reviewed by Tamara Meadows RP on 5/4/2022 at 11:25 AM    Relevant Laboratory Values  A1C Last 3 Results    HGBA1C Last 3 Results 8/10/21 12/14/21 3/23/22   Hemoglobin A1C 10.30 (A) 7.3 6.3   (A) Abnormal value            Lab Results   Component Value Date    HGBA1C 6.3 03/23/2022     Lab Results   Component Value Date    GLUCOSE 473 (C) 08/10/2021    CALCIUM 9.4 08/10/2021     (L) 08/10/2021    K 4.6 08/10/2021    CO2 29.0 08/10/2021    CL 98 08/10/2021    BUN 12 08/10/2021    CREATININE 0.99 08/10/2021    EGFRIFNONA 59 (L) 08/10/2021    BCR 12.1 08/10/2021    ANIONGAP 7.0 08/10/2021     No results found for: CHOL, CHLPL, TRIG, HDL, LDL, LDLDIRECT      Current Medication List  This medication list has been reviewed with the patient and evaluated for any interactions or necessary modifications/recommendations, and updated to include all prescription medications, OTC medications, and supplements the patient is currently taking.  This list reflects what is contained in the patient's profile, which has also been marked as reviewed to communicate to other providers it is the most up to date version of the patient's current medication therapy.     Current Outpatient Medications:   •  B-D ULTRAFINE III SHORT PEN 31G X 8 MM misc, , Disp: , Rfl:   •  Dulaglutide (Trulicity) 1.5 MG/0.5ML solution pen-injector, Inject 1.5 mg under the skin into the appropriate area as directed 1 (One) Time Per Week., Disp: 2 mL, Rfl: 5  •  escitalopram (LEXAPRO) 10 MG tablet, Take 1 tablet by mouth Daily. (Patient taking differently: Take 20 mg by mouth Daily.), Disp: 90 tablet, Rfl: 0  •  fenofibrate (TRICOR) 145 MG tablet, Take 1 tablet by mouth Daily., Disp: 90 tablet, Rfl: 0  •  hydrOXYzine (ATARAX) 10 MG tablet, Daily As Needed., Disp: , Rfl:   •  insulin aspart (NovoLOG FlexPen) 100 UNIT/ML solution pen-injector sc pen, Inject 35 Units under the skin into the appropriate area as  directed 3 (Three) Times a Day With Meals., Disp: 30 mL, Rfl: 5  •  Insulin Glargine, 2 Unit Dial, (Toujeo Max SoloStar) 300 UNIT/ML solution pen-injector injection, Inject 75 Units under the skin into the appropriate area as directed every night at bedtime., Disp: 9 mL, Rfl: 5  •  magnesium oxide (MAGOX) 400 (241.3 Mg) MG tablet tablet, Take 400 mg by mouth Daily., Disp: , Rfl:   •  metFORMIN ER (GLUCOPHAGE-XR) 500 MG 24 hr tablet, Take 1 tablet by mouth every night at bedtime., Disp: 30 tablet, Rfl: 5  •  montelukast (SINGULAIR) 10 MG tablet, Take 10 mg by mouth., Disp: , Rfl:   •  rosuvastatin (CRESTOR) 20 MG tablet, Take 1 tablet by mouth Daily., Disp: 30 tablet, Rfl: 5  •  budesonide-formoterol (SYMBICORT) 160-4.5 MCG/ACT inhaler, Inhale 2 puffs 2 (Two) Times a Day. PRN, Disp: , Rfl:   •  Continuous Blood Gluc  (Dexcom G6 ) device, 1 each Continuous., Disp: 1 each, Rfl: 0  •  Continuous Blood Gluc Sensor (Dexcom G6 Sensor), Every 10 (Ten) Days., Disp: 3 each, Rfl: 11  •  Continuous Blood Gluc Transmit (Dexcom G6 Transmitter) misc, 1 each Every 3 (Three) Months., Disp: 1 each, Rfl: 3  •  lamoTRIgine (LaMICtal) 25 MG tablet, Take 2 tablets by mouth 2 (two) times a day. (Patient taking differently: Take 100 mg by mouth Daily.), Disp: 360 tablet, Rfl: 0    Medicines reviewed by Tamara Meadows [facility] on 5/4/2022 at 11:28 AM    Drug Interactions  None    Adverse Drug Reactions  • Adverse Reactions Experienced: None  • Plan for ADR Management: Not required    Hospitalizations and Urgent Care Since Last Assessment  • Hospitalizations or Admissions: None  • ED Visits: None  • Urgent Office Visits: None    Adherence and Self-Administration  Adherence related patient's specialty therapy was discussed with the patient. The Adherence segment of this outreach has been reviewed and updated.     • Ongoing or New Barriers to Patient Adherence and/or Self-Administration:None  • Methods for Supporting Patient  Adherence and/or Self-Administration: None required     Goals of Therapy  Goals related to the patient's specialty therapy was discussed with the patient. The Patient Goals segment of this outreach has been reviewed and updated.    Goals     • HEMOGLOBIN A1C < 7          Reassessment Plan & Follow-Up  1. Medication Therapy Changes: None.   2. Additional Plans, Therapy Recommendations, or Therapy Problems to Be Addressed: No pharmacologic recommendations at this time. Patient is doing well with current therapy, will reassess when A1C can be re-checked.   3. Pharmacist to perform regular reassessments no more than (6) months from the previous assessment.  4. Care Coordinator to set up future refill outreaches, coordinate prescription delivery, and escalate clinical questions to pharmacist.     Attestation  I attest that the specialty medication(s) addressed above are appropriate for the patient based on my reassessment.  If the prescribed therapy is at any point deemed not appropriate based on the current or future assessments, a consultation will be initiated with the patient's specialty care provider to determine the best course of action. The revised plan of therapy will be documented along with any additional patient education provided.     Tamara Meadows, PharmD  5/4/2022  11:31 EDT

## 2022-05-12 ENCOUNTER — TELEPHONE (OUTPATIENT)
Dept: ENDOCRINOLOGY | Facility: CLINIC | Age: 52
End: 2022-05-12

## 2022-05-12 NOTE — TELEPHONE ENCOUNTER
She is having frequent low BG's.  She notices that these are happening when she is exercising more.  These are happening at various times of the day, but mostly in the evenings.  She is currently doing Novolog 20-30 units with meals, Toujeo 64 units qhs.    Decrease Toujeo to 60 units qhs.  Decrease Novolog 10-15 units TID with meals.    Please call the office back in 2 weeks for BG values and medication adjustment as indicated.

## 2022-05-12 NOTE — TELEPHONE ENCOUNTER
Will you check and see what her fasting blood sugars ranges are and what her post meal blood sugars are running?

## 2022-05-12 NOTE — TELEPHONE ENCOUNTER
Patient called with some concerns. She went to her PCP yesterday and they wanted to change her Novolog and Toujeo around. They wanted to switch her Novolg to 10 to 15 units before meal time and then drop the Toujeo to 60 units nightly. They also checked her A1C and it was 5.7. She is having some lows. She just wanted to see what we want to do before following PCP orders.

## 2022-05-20 ENCOUNTER — SPECIALTY PHARMACY (OUTPATIENT)
Dept: PHARMACY | Facility: HOSPITAL | Age: 52
End: 2022-05-20

## 2022-05-20 DIAGNOSIS — Z79.4 TYPE 2 DIABETES MELLITUS WITH HYPERGLYCEMIA, WITH LONG-TERM CURRENT USE OF INSULIN: Primary | ICD-10-CM

## 2022-05-20 DIAGNOSIS — E11.65 TYPE 2 DIABETES MELLITUS WITH HYPERGLYCEMIA, WITH LONG-TERM CURRENT USE OF INSULIN: Primary | ICD-10-CM

## 2022-05-20 RX ORDER — INSULIN GLARGINE 300 U/ML
75 INJECTION, SOLUTION SUBCUTANEOUS
Qty: 12 ML | Refills: 5 | Status: SHIPPED | OUTPATIENT
Start: 2022-05-20 | End: 2022-06-29 | Stop reason: SDUPTHER

## 2022-05-20 RX ORDER — INSULIN ASPART 100 [IU]/ML
35 INJECTION, SOLUTION INTRAVENOUS; SUBCUTANEOUS
Qty: 30 ML | Refills: 5 | Status: SHIPPED | OUTPATIENT
Start: 2022-05-20 | End: 2022-06-29 | Stop reason: SDUPTHER

## 2022-05-20 NOTE — PROGRESS NOTES
Specialty Pharmacy Patient Management Program  Endocrinology Reassessment     Michelle Estrada is a 51 y.o. female with Type 2 Diabetes enrolled in the Endocrinology Patient Management program offered by [facility]. A follow-up was conducted, including assessment of continued therapy appropriateness, medication adherence, and side effect incidence and management for  Trulicity, insulin therapy, and metformin..     Patient is doing well with current regimen. She reports no side effects. She is using Dexcom now and [place] her supplies [place] [facility]. She needs refills on her Toujeo and Novolog today.      Changes to Insurance Coverage or Financial Support  None    Relevant Past Medical History and Comorbidities  Relevant medical history and concomitant health conditions were discussed with the patient. The patient's chart has been reviewed for relevant past medical history and comorbid health conditions and updated as necessary.   Past Medical History:   Diagnosis Date   • Anxiety    • Bipolar 1 disorder (HCC)    • COPD (chronic obstructive pulmonary disease) (HCC)    • Depression    • Diabetes mellitus (HCC)    • Fibromyalgia    • Neuropathy    • PTSD (post-traumatic stress disorder)      Social History     Socioeconomic History   • Marital status: Single   Tobacco Use   • Smoking status: Current Every Day Smoker   Substance and Sexual Activity   • Alcohol use: Never   • Drug use: Not Currently     Comment: recovering   • Sexual activity: Never            Allergies  Known allergies and reactions were discussed with the patient. The patient's chart has been reviewed for allergy information and updated as necessary.   Dilantin [phenytoin] and Lisinopril         Relevant Laboratory Values  A1C Last 3 Results    HGBA1C Last 3 Results 8/10/21 12/14/21 3/23/22   Hemoglobin A1C 10.30 (A) 7.3 6.3   (A) Abnormal value            Lab Results   Component Value Date    HGBA1C 6.3 03/23/2022     Lab  Results   Component Value Date    GLUCOSE 473 (C) 08/10/2021    CALCIUM 9.4 08/10/2021     (L) 08/10/2021    K 4.6 08/10/2021    CO2 29.0 08/10/2021    CL 98 08/10/2021    BUN 12 08/10/2021    CREATININE 0.99 08/10/2021    EGFRIFNONA 59 (L) 08/10/2021    BCR 12.1 08/10/2021    ANIONGAP 7.0 08/10/2021     No results found for: CHOL, CHLPL, TRIG, HDL, LDL, LDLDIRECT      Current Medication List  This medication list has been reviewed with the patient and evaluated for any interactions or necessary modifications/recommendations, and updated to include all prescription medications, OTC medications, and supplements the patient is currently taking.  This list reflects what is contained in the patient's profile, which has also been marked as reviewed to communicate to other providers it is the most up to date version of the patient's current medication therapy.     Current Outpatient Medications:   •  B-D ULTRAFINE III SHORT PEN 31G X 8 MM misc, , Disp: , Rfl:   •  budesonide-formoterol (SYMBICORT) 160-4.5 MCG/ACT inhaler, Inhale 2 puffs 2 (Two) Times a Day. PRN, Disp: , Rfl:   •  Continuous Blood Gluc  (Dexcom G6 ) device, 1 each Continuous., Disp: 1 each, Rfl: 0  •  Continuous Blood Gluc Sensor (Dexcom G6 Sensor), Every 10 (Ten) Days., Disp: 3 each, Rfl: 11  •  Continuous Blood Gluc Transmit (Dexcom G6 Transmitter) misc, 1 each Every 3 (Three) Months., Disp: 1 each, Rfl: 3  •  Dulaglutide (Trulicity) 1.5 MG/0.5ML solution pen-injector, Inject 1.5 mg under the skin into the appropriate area as directed 1 (One) Time Per Week., Disp: 2 mL, Rfl: 5  •  escitalopram (LEXAPRO) 10 MG tablet, Take 1 tablet by mouth Daily. (Patient taking differently: Take 20 mg by mouth Daily.), Disp: 90 tablet, Rfl: 0  •  fenofibrate (TRICOR) 145 MG tablet, Take 1 tablet by mouth Daily., Disp: 90 tablet, Rfl: 0  •  hydrOXYzine (ATARAX) 10 MG tablet, Daily As Needed., Disp: , Rfl:   •  insulin aspart (NovoLOG FlexPen) 100  UNIT/ML solution pen-injector sc pen, Inject 35 Units under the skin into the appropriate area as directed 3 (Three) Times a Day With Meals., Disp: 30 mL, Rfl: 5  •  Insulin Glargine, 2 Unit Dial, (Toujeo Max SoloStar) 300 UNIT/ML solution pen-injector injection, Inject 75 Units under the skin into the appropriate area as directed every night at bedtime., Disp: 9 mL, Rfl: 5  •  lamoTRIgine (LaMICtal) 25 MG tablet, Take 2 tablets by mouth 2 (two) times a day. (Patient taking differently: Take 100 mg by mouth Daily.), Disp: 360 tablet, Rfl: 0  •  magnesium oxide (MAGOX) 400 (241.3 Mg) MG tablet tablet, Take 400 mg by mouth Daily., Disp: , Rfl:   •  metFORMIN ER (GLUCOPHAGE-XR) 500 MG 24 hr tablet, Take 1 tablet by mouth every night at bedtime., Disp: 30 tablet, Rfl: 5  •  montelukast (SINGULAIR) 10 MG tablet, Take 10 mg by mouth., Disp: , Rfl:   •  rosuvastatin (CRESTOR) 20 MG tablet, Take 1 tablet by mouth Daily., Disp: 30 tablet, Rfl: 5         Drug Interactions  None    Adverse Drug Reactions  • Adverse Reactions Experienced: None  • Plan for ADR Management: Not required    Hospitalizations and Urgent Care Since Last Assessment  • Hospitalizations or Admissions: None  • ED Visits: None  • Urgent Office Visits: None    Adherence and Self-Administration  Adherence related patient's specialty therapy was discussed with the patient. The Adherence segment of this outreach has been reviewed and updated.     • Ongoing or New Barriers to Patient Adherence and/or Self-Administration:None  • Methods for Supporting Patient Adherence and/or Self-Administration: None required     Goals of Therapy  Goals related to the patient's specialty therapy was discussed with the patient. The Patient Goals segment of this outreach has been reviewed and updated.    Goals     • HEMOGLOBIN A1C < 7          Reassessment Plan & Follow-Up  1. Medication Therapy Changes: None. Patient needs refills sent to pharmacy today for Toujeo and Novolog.  Will send and mail out.   2. Additional Plans, Therapy Recommendations, or Therapy Problems to Be Addressed: None  3. Pharmacist to perform regular reassessments no more than (6) months from the previous assessment.  4. Care Coordinator to set up future refill outreaches, coordinate prescription delivery, and escalate clinical questions to pharmacist.     Attestation  I attest that the specialty medication(s) addressed above are appropriate for the patient based on my reassessment.  If the prescribed therapy is at any point deemed not appropriate based on the current or future assessments, a consultation will be initiated with the patient's specialty care provider to determine the best course of action. The revised plan of therapy will be documented along with any additional patient education provided.     Tamara Meadows, PharmD  5/20/2022  09:34 EDT

## 2022-06-06 ENCOUNTER — TRANSCRIBE ORDERS (OUTPATIENT)
Dept: ADMINISTRATIVE | Facility: HOSPITAL | Age: 52
End: 2022-06-06

## 2022-06-06 DIAGNOSIS — Z11.59 ENCOUNTER FOR SCREENING FOR VIRAL DISEASE: Primary | ICD-10-CM

## 2022-06-13 ENCOUNTER — SPECIALTY PHARMACY (OUTPATIENT)
Dept: PHARMACY | Facility: HOSPITAL | Age: 52
End: 2022-06-13

## 2022-06-14 RX ORDER — ROSUVASTATIN CALCIUM 20 MG/1
20 TABLET, COATED ORAL DAILY
Qty: 30 TABLET | Refills: 5 | Status: SHIPPED | OUTPATIENT
Start: 2022-06-14 | End: 2022-12-28 | Stop reason: SDUPTHER

## 2022-06-26 ENCOUNTER — APPOINTMENT (OUTPATIENT)
Dept: PREADMISSION TESTING | Facility: HOSPITAL | Age: 52
End: 2022-06-26

## 2022-06-29 ENCOUNTER — OFFICE VISIT (OUTPATIENT)
Dept: ENDOCRINOLOGY | Facility: CLINIC | Age: 52
End: 2022-06-29

## 2022-06-29 ENCOUNTER — SPECIALTY PHARMACY (OUTPATIENT)
Dept: PHARMACY | Facility: HOSPITAL | Age: 52
End: 2022-06-29

## 2022-06-29 VITALS
BODY MASS INDEX: 43.6 KG/M2 | OXYGEN SATURATION: 96 % | HEART RATE: 94 BPM | SYSTOLIC BLOOD PRESSURE: 130 MMHG | WEIGHT: 254 LBS | DIASTOLIC BLOOD PRESSURE: 80 MMHG

## 2022-06-29 DIAGNOSIS — E11.65 TYPE 2 DIABETES MELLITUS WITH HYPERGLYCEMIA, WITH LONG-TERM CURRENT USE OF INSULIN: Primary | ICD-10-CM

## 2022-06-29 DIAGNOSIS — Z79.4 TYPE 2 DIABETES MELLITUS WITH HYPERGLYCEMIA, WITH LONG-TERM CURRENT USE OF INSULIN: Primary | ICD-10-CM

## 2022-06-29 LAB — GLUCOSE BLDC GLUCOMTR-MCNC: 152 MG/DL (ref 70–130)

## 2022-06-29 PROCEDURE — 95251 CONT GLUC MNTR ANALYSIS I&R: CPT | Performed by: NURSE PRACTITIONER

## 2022-06-29 PROCEDURE — 99213 OFFICE O/P EST LOW 20 MIN: CPT | Performed by: NURSE PRACTITIONER

## 2022-06-29 RX ORDER — CYCLOBENZAPRINE HCL 10 MG
10 TABLET ORAL DAILY
COMMUNITY
End: 2022-09-28

## 2022-06-29 RX ORDER — INSULIN ASPART 100 [IU]/ML
20 INJECTION, SOLUTION INTRAVENOUS; SUBCUTANEOUS
Qty: 15 ML | Refills: 5 | Status: SHIPPED | OUTPATIENT
Start: 2022-06-29 | End: 2022-09-28 | Stop reason: SDUPTHER

## 2022-06-29 RX ORDER — INSULIN GLARGINE 300 U/ML
60 INJECTION, SOLUTION SUBCUTANEOUS
Qty: 15 ML | Refills: 5 | Status: SHIPPED | OUTPATIENT
Start: 2022-06-29 | End: 2022-09-28 | Stop reason: SDUPTHER

## 2022-06-29 RX ORDER — DULAGLUTIDE 3 MG/.5ML
3 INJECTION, SOLUTION SUBCUTANEOUS WEEKLY
Qty: 6 ML | Refills: 1 | Status: SHIPPED | OUTPATIENT
Start: 2022-06-29 | End: 2022-09-28

## 2022-06-29 RX ORDER — METFORMIN HYDROCHLORIDE 500 MG/1
500 TABLET, EXTENDED RELEASE ORAL
Qty: 30 TABLET | Refills: 5 | Status: SHIPPED | OUTPATIENT
Start: 2022-06-29 | End: 2022-09-28 | Stop reason: SDUPTHER

## 2022-06-29 NOTE — PROGRESS NOTES
Chief Complaint   Patient presents with   • Diabetes      Michelle Estrada is a 51 y.o. female had concerns including Diabetes.      She has improved her diet and moving around more as tolerated.  She has a new dog that keeps her on the move.  She had made improvements to her diet and exercise.    She is checking blood sugars with Dexcom CGM.  Current medications for diabetes include Trulicity 1.5 mg weekly-tolerating this well, Novolog 10-15 units with meals, Toujeo 60 units qhs, Metformin 500 mg QD.  Hypos: occasionally  Last optho exam: 2022-Dr. Horton    Most recent A1C:  5.7 (5/11/2022)    The following portions of the patient's history were reviewed and updated as appropriate: allergies, current medications, past family history, past medical history, past social history, past surgical history and problem list.      Review of Systems   Constitutional: Negative for fatigue.   Endocrine: Positive for polydipsia and polyphagia. Negative for polyuria.   Psychiatric/Behavioral: Positive for sleep disturbance.   All other systems reviewed and are negative.       Physical Exam  Vitals reviewed.   Constitutional:       Appearance: Normal appearance.   Cardiovascular:      Rate and Rhythm: Normal rate.   Pulmonary:      Effort: Pulmonary effort is normal.   Neurological:      Mental Status: She is alert and oriented to person, place, and time.   Psychiatric:         Mood and Affect: Mood normal.         Behavior: Behavior normal.         Thought Content: Thought content normal.         Judgment: Judgment normal.        /80 (BP Location: Left arm, Patient Position: Sitting, Cuff Size: Adult)   Pulse 94   Wt 115 kg (254 lb)   SpO2 96%   BMI 43.60 kg/m²      Labs and imaging    CMP:  Lab Results   Component Value Date     (H) 02/08/2022    BUN 11 02/08/2022    CREATININE 0.80 02/08/2022    EGFRIFNONA >60 02/08/2022    EGFRIFAFRI >60 02/08/2022    BCR 14 02/08/2022     02/08/2022    K 4.6 02/08/2022     CO2 26 02/08/2022    CALCIUM 8.8 (L) 02/08/2022    ALBUMIN 3.5 02/08/2022    BILITOT 0.3 02/08/2022    ALKPHOS 88 02/08/2022    AST 21 02/08/2022    ALT 22 02/08/2022     Lipid Panel:  No results found for: CHOL, TRIG, HDL, VLDL, LDL  HbA1c:  Lab Results   Component Value Date    HGBA1C 6.3 03/23/2022    HGBA1C 7.3 12/14/2021     Glucose:    Lab Results   Component Value Date    POCGLU 152 (A) 06/29/2022     Microalbumin:  No results found for: MALBCRERATIO  TSH:  No results found for: TSH    Assessment and plan  Diagnoses and all orders for this visit:    1. Type 2 diabetes mellitus with hyperglycemia, with long-term current use of insulin (HCC) (Primary)  Assessment & Plan:  -Diabetes is improving with A1C down from 6.3 to 5.7.  -patient is interested in decreasing how much insulin she is taking.  Will start her on this regimen.  -Continue Toujeo 60 units qhs. Patient given titration scale on how to reduce dose if FSBG are below goal.  -Continue Metformin 500 mg QD.  -Continue Novolog 10-15 units TID with meals. Titration scale given to decrease dose as tolerable if hypos develop.  -Increase Trulicity to 3 mg weekly.  -Continue using Dexcom CGM.  2-week data download shows the following:  Very High: <1%  High: 29%  In Range: 70%  Low: <1%  Very Low: <1%  Trend shows some mild post-meal hyperglycemia.  -S/S hypoglycemia reviewed with Rule of 15's advised.  -Continue with improvements that have been made to diet and exercise regimen.  -Schedule a diabetic eye exam.  -Will consider adding on SGLT-2 in the future to aid in obtaining patient goals to reduce insulin use.  -Follow-up in 3 months.      Orders:  -     POC Glucose Fingerstick       Return in about 3 months (around 9/29/2022) for Follow-up appointment, A1C. The patient was instructed to contact the clinic with any interval questions or concerns.    This document has been electronically signed by JIN Jara  June 29, 2022 16:36  EDT  Endocrinology

## 2022-06-29 NOTE — PROGRESS NOTES
Specialty Pharmacy Patient Management Program  Endocrinology Reassessment     Michelle Estrada is a 51 y.o. female with Type 2 Diabetes enrolled in the Endocrinology Patient Management program offered by [facility]. A follow-up was conducted, including assessment of continued therapy appropriateness, medication adherence, and side effect incidence and management for Trulicity, insulin therapy, and metformin.    Patient is currently taking Novolog 10-15 units TID with meals, Toujeo 60 units at night, Trulicity 1.5 mg weekly, and Metformin  mg daily. She is doing well with current regimen and reports no side effects. She is using Dexcom to monitor BG with readings <200 on average. She reports no low BG <70.  She receives her Dexcom supplies from [facility] pharmacy.     Changes to Insurance Coverage or Financial Support  None    Relevant Past Medical History and Comorbidities  Relevant medical history and concomitant health conditions were discussed with the patient. The patient's chart has been reviewed for relevant past medical history and comorbid health conditions and updated as necessary.   Past Medical History:   Diagnosis Date   • Anxiety    • Bipolar 1 disorder (HCC)    • COPD (chronic obstructive pulmonary disease) (HCC)    • Depression    • Diabetes mellitus (HCC)    • Fibromyalgia    • Neuropathy    • PTSD (post-traumatic stress disorder)      Social History     Socioeconomic History   • Marital status: Single   Tobacco Use   • Smoking status: Current Every Day Smoker   Substance and Sexual Activity   • Alcohol use: Never   • Drug use: Not Currently     Comment: recovering   • Sexual activity: Never            Allergies  Known allergies and reactions were discussed with the patient. The patient's chart has been reviewed for allergy information and updated as necessary.   Dilantin [phenytoin] and Lisinopril    Allergies reviewed by Tamara Meadows RPH on 6/29/2022 at  3:32  PM    Relevant Laboratory Values  A1C Last 3 Results    HGBA1C Last 3 Results 8/10/21 12/14/21 3/23/22   Hemoglobin A1C 10.30 (A) 7.3 6.3   (A) Abnormal value            Lab Results   Component Value Date    HGBA1C 6.3 03/23/2022     Lab Results   Component Value Date    GLUCOSE 94 01/30/2022    CALCIUM 8.8 (L) 02/08/2022     02/08/2022    K 4.6 02/08/2022    CO2 26 02/08/2022     02/08/2022    BUN 11 02/08/2022    CREATININE 0.80 02/08/2022    EGFRIFAFRI >60 02/08/2022    EGFRIFNONA >60 02/08/2022    BCR 14 02/08/2022    ANIONGAP 10 02/08/2022     No results found for: CHOL, CHLPL, TRIG, HDL, LDL, LDLDIRECT      Current Medication List  This medication list has been reviewed with the patient and evaluated for any interactions or necessary modifications/recommendations, and updated to include all prescription medications, OTC medications, and supplements the patient is currently taking.  This list reflects what is contained in the patient's profile, which has also been marked as reviewed to communicate to other providers it is the most up to date version of the patient's current medication therapy.     Current Outpatient Medications:   •  B-D ULTRAFINE III SHORT PEN 31G X 8 MM misc, , Disp: , Rfl:   •  budesonide-formoterol (SYMBICORT) 160-4.5 MCG/ACT inhaler, Inhale 2 puffs 2 (Two) Times a Day. PRN, Disp: , Rfl:   •  Continuous Blood Gluc  (Dexcom G6 ) device, 1 each Continuous., Disp: 1 each, Rfl: 0  •  Continuous Blood Gluc Sensor (Dexcom G6 Sensor), Every 10 (Ten) Days., Disp: 3 each, Rfl: 11  •  Continuous Blood Gluc Transmit (Dexcom G6 Transmitter) misc, 1 each Every 3 (Three) Months., Disp: 1 each, Rfl: 3  •  cyclobenzaprine (FLEXERIL) 10 MG tablet, Take 10 mg by mouth Daily., Disp: , Rfl:   •  escitalopram (LEXAPRO) 10 MG tablet, Take 1 tablet by mouth Daily. (Patient taking differently: Take 20 mg by mouth Daily.), Disp: 90 tablet, Rfl: 0  •  fenofibrate (TRICOR) 145 MG tablet,  Take 1 tablet by mouth Daily., Disp: 90 tablet, Rfl: 2  •  hydrOXYzine (ATARAX) 10 MG tablet, Daily As Needed., Disp: , Rfl:   •  insulin aspart (NovoLOG FlexPen) 100 UNIT/ML solution pen-injector sc pen, Inject 20 Units under the skin into the appropriate area as directed 3 (Three) Times a Day With Meals., Disp: 15 mL, Rfl: 5  •  Insulin Glargine, 2 Unit Dial, (Toujeo Max SoloStar) 300 UNIT/ML solution pen-injector injection, Inject 60 Units under the skin into the appropriate area as directed every night at bedtime., Disp: 15 mL, Rfl: 5  •  lamoTRIgine (LaMICtal) 25 MG tablet, Take 2 tablets by mouth 2 (two) times a day. (Patient taking differently: Take 100 mg by mouth Daily.), Disp: 360 tablet, Rfl: 0  •  magnesium oxide (MAGOX) 400 (241.3 Mg) MG tablet tablet, Take 400 mg by mouth Daily., Disp: , Rfl:   •  metFORMIN ER (GLUCOPHAGE-XR) 500 MG 24 hr tablet, Take 1 tablet by mouth every night at bedtime., Disp: 30 tablet, Rfl: 5  •  montelukast (SINGULAIR) 10 MG tablet, Take 10 mg by mouth., Disp: , Rfl:   •  rosuvastatin (CRESTOR) 20 MG tablet, Take 1 tablet by mouth Daily., Disp: 30 tablet, Rfl: 5  •  Dulaglutide (Trulicity) 3 MG/0.5ML solution pen-injector, Inject 0.5 mL under the skin into the appropriate area as directed 1 (One) Time Per Week., Disp: 6 mL, Rfl: 1    Medicines reviewed by Tamara Meadows [facility] on 6/29/2022 at  3:35 PM    Drug Interactions  The hypoglycemic effect of Insulin may be increased by Fibric Acid Derivatives.     Adverse Drug Reactions  • Adverse Reactions Experienced: None  • Plan for ADR Management: Not required    Hospitalizations and Urgent Care Since Last Assessment  • Hospitalizations or Admissions: None  • ED Visits: None  • Urgent Office Visits: None    Adherence and Self-Administration  Adherence related patient's specialty therapy was discussed with the patient. The Adherence segment of this outreach has been reviewed and updated.     • Ongoing or New Barriers to Patient  Adherence and/or Self-Administration:None  • Methods for Supporting Patient Adherence and/or Self-Administration: None required     Goals of Therapy  Goals related to the patient's specialty therapy was discussed with the patient. The Patient Goals segment of this outreach has been reviewed and updated.    Goals     • HEMOGLOBIN A1C < 7          Reassessment Plan & Follow-Up  1. Medication Therapy Changes: Patient will increase to Trulicity 3 mg weekly.   2. Additional Plans, Therapy Recommendations, or Therapy Problems to Be Addressed: Her diabetes is at goal and continues to improve with A1C down to 5.4% from 6.3%. No other pharmacologic recommendations at this time.   3. Pharmacist to perform regular reassessments no more than (6) months from the previous assessment.  4. Care Coordinator to set up future refill outreaches, coordinate prescription delivery, and escalate clinical questions to pharmacist.     Attestation  I attest that the specialty medication(s) addressed above are appropriate for the patient based on my reassessment.  If the prescribed therapy is at any point deemed not appropriate based on the current or future assessments, a consultation will be initiated with the patient's specialty care provider to determine the best course of action. The revised plan of therapy will be documented along with any additional patient education provided.     Tamara Meadows, Vishal  6/29/2022  16:12 EDT

## 2022-06-29 NOTE — ASSESSMENT & PLAN NOTE
-Diabetes is improving with A1C down from 6.3 to 5.7.  -patient is interested in decreasing how much insulin she is taking.  Will start her on this regimen.  -Continue Toujeo 60 units qhs. Patient given titration scale on how to reduce dose if FSBG are below goal.  -Continue Metformin 500 mg QD.  -Continue Novolog 10-15 units TID with meals. Titration scale given to decrease dose as tolerable if hypos develop.  -Increase Trulicity to 3 mg weekly.  -Continue using Dexcom CGM.  2-week data download shows the following:  Very High: <1%  High: 29%  In Range: 70%  Low: <1%  Very Low: <1%  Trend shows some mild post-meal hyperglycemia.  -S/S hypoglycemia reviewed with Rule of 15's advised.  -Continue with improvements that have been made to diet and exercise regimen.  -Schedule a diabetic eye exam.  -Will consider adding on SGLT-2 in the future to aid in obtaining patient goals to reduce insulin use.  -Follow-up in 3 months.

## 2022-07-06 ENCOUNTER — SPECIALTY PHARMACY (OUTPATIENT)
Dept: PHARMACY | Facility: HOSPITAL | Age: 52
End: 2022-07-06

## 2022-07-25 ENCOUNTER — TELEPHONE (OUTPATIENT)
Dept: ENDOCRINOLOGY | Facility: CLINIC | Age: 52
End: 2022-07-25

## 2022-08-09 ENCOUNTER — TELEPHONE (OUTPATIENT)
Dept: ENDOCRINOLOGY | Facility: CLINIC | Age: 52
End: 2022-08-09

## 2022-08-09 RX ORDER — ROSUVASTATIN CALCIUM 20 MG/1
20 TABLET, COATED ORAL DAILY
Qty: 30 TABLET | Refills: 5 | Status: CANCELLED | OUTPATIENT
Start: 2022-08-09

## 2022-08-17 ENCOUNTER — SPECIALTY PHARMACY (OUTPATIENT)
Dept: PHARMACY | Facility: HOSPITAL | Age: 52
End: 2022-08-17

## 2022-08-17 NOTE — PROGRESS NOTES
Patient sees a physician at [facility] in [place] next week for a blockage. Instructed patient to call clinic if any medication changes are made.

## 2022-09-21 ENCOUNTER — SPECIALTY PHARMACY (OUTPATIENT)
Dept: PHARMACY | Facility: HOSPITAL | Age: 52
End: 2022-09-21

## 2022-09-21 RX ORDER — ASPIRIN 81 MG/1
81 TABLET ORAL DAILY
COMMUNITY

## 2022-09-21 NOTE — PROGRESS NOTES
Reviewed med change and added new medications to med list. Not an issue for diabetes management.     Tamara Meadows [facility]   09/21/22  08:49 EDT

## 2022-09-28 ENCOUNTER — OFFICE VISIT (OUTPATIENT)
Dept: ENDOCRINOLOGY | Facility: CLINIC | Age: 52
End: 2022-09-28

## 2022-09-28 ENCOUNTER — SPECIALTY PHARMACY (OUTPATIENT)
Dept: PHARMACY | Facility: HOSPITAL | Age: 52
End: 2022-09-28

## 2022-09-28 VITALS
BODY MASS INDEX: 44.25 KG/M2 | HEART RATE: 97 BPM | HEIGHT: 64 IN | SYSTOLIC BLOOD PRESSURE: 144 MMHG | WEIGHT: 259.2 LBS | OXYGEN SATURATION: 96 % | DIASTOLIC BLOOD PRESSURE: 78 MMHG

## 2022-09-28 DIAGNOSIS — E11.65 TYPE 2 DIABETES MELLITUS WITH HYPERGLYCEMIA, WITH LONG-TERM CURRENT USE OF INSULIN: Primary | ICD-10-CM

## 2022-09-28 DIAGNOSIS — Z79.4 TYPE 2 DIABETES MELLITUS WITH HYPERGLYCEMIA, WITH LONG-TERM CURRENT USE OF INSULIN: Primary | ICD-10-CM

## 2022-09-28 LAB
EXPIRATION DATE: ABNORMAL
GLUCOSE BLDC GLUCOMTR-MCNC: 256 MG/DL (ref 70–130)
HBA1C MFR BLD: 7.4 %
Lab: ABNORMAL

## 2022-09-28 PROCEDURE — 95251 CONT GLUC MNTR ANALYSIS I&R: CPT | Performed by: NURSE PRACTITIONER

## 2022-09-28 PROCEDURE — 3051F HG A1C>EQUAL 7.0%<8.0%: CPT | Performed by: NURSE PRACTITIONER

## 2022-09-28 PROCEDURE — 99213 OFFICE O/P EST LOW 20 MIN: CPT | Performed by: NURSE PRACTITIONER

## 2022-09-28 PROCEDURE — 83036 HEMOGLOBIN GLYCOSYLATED A1C: CPT | Performed by: NURSE PRACTITIONER

## 2022-09-28 PROCEDURE — 82962 GLUCOSE BLOOD TEST: CPT | Performed by: NURSE PRACTITIONER

## 2022-09-28 RX ORDER — METFORMIN HYDROCHLORIDE 500 MG/1
500 TABLET, EXTENDED RELEASE ORAL 2 TIMES DAILY
Qty: 180 TABLET | Refills: 1 | Status: SHIPPED | OUTPATIENT
Start: 2022-09-28 | End: 2022-12-28 | Stop reason: SDUPTHER

## 2022-09-28 RX ORDER — DULAGLUTIDE 4.5 MG/.5ML
4.5 INJECTION, SOLUTION SUBCUTANEOUS WEEKLY
Qty: 6 ML | Refills: 1 | Status: SHIPPED | OUTPATIENT
Start: 2022-09-28 | End: 2022-12-28 | Stop reason: SDUPTHER

## 2022-09-28 RX ORDER — INSULIN ASPART 100 [IU]/ML
20 INJECTION, SOLUTION INTRAVENOUS; SUBCUTANEOUS
Qty: 15 ML | Refills: 5 | Status: SHIPPED | OUTPATIENT
Start: 2022-09-28 | End: 2022-12-28 | Stop reason: SDUPTHER

## 2022-09-28 RX ORDER — PREDNISONE 10 MG/1
TABLET ORAL
COMMUNITY
Start: 2022-09-27 | End: 2022-12-28

## 2022-09-28 RX ORDER — INSULIN GLARGINE 300 U/ML
60 INJECTION, SOLUTION SUBCUTANEOUS
Qty: 6 ML | Refills: 5 | Status: SHIPPED | OUTPATIENT
Start: 2022-09-28 | End: 2022-12-28 | Stop reason: SDUPTHER

## 2022-09-28 RX ORDER — MECLIZINE HYDROCHLORIDE 25 MG/1
25 TABLET ORAL 3 TIMES DAILY
COMMUNITY
Start: 2022-09-22

## 2022-09-28 NOTE — ASSESSMENT & PLAN NOTE
-Diabetes is worsening with A1C up from 5.7 to 7.4.  -Discussed importance of dietary and exercise guidelines.  -Continue Toujeo 60 units qhs.   -Increase Metformin 500 mg BID.  -Continue Novolog 20 units TID with meals. Titration scale given to decrease dose as tolerable if hypos develop.  -Increase Trulicity to 4.5 mg weekly.  -Continue using Dexcom CGM.  2-week data download shows the following:  Very High: 26%  High: 39%  In Range: 35%  Low: 0%  Very Low: 0%  Trend shows some mild post-meal hyperglycemia.  -S/S hypoglycemia reviewed with Rule of 15's advised.  -Continue with improvements that have been made to diet and exercise regimen.  -Schedule a diabetic eye exam.  -Will consider adding on SGLT-2 in the future to aid in obtaining patient goals to reduce insulin use.  -Follow-up in 3 months.

## 2022-09-28 NOTE — PROGRESS NOTES
"Chief Complaint   Patient presents with   • Diabetes     Type 2        Michelle Estrada is a 52 y.o. female had concerns including Diabetes (Type 2).      She has improved her diet and moving around more as tolerated.  She has a new dog that keeps her on the move.  She had made improvements to her diet and exercise.    She is checking blood sugars with Dexcom CGM.  Current medications for diabetes include Trulicity 3 mg weekly-tolerating this well, Novolog 20 units with meals, Toujeo 60 units qhs, Metformin 500 mg QD.  Hypos: occasionally  Last optho exam: 2022-Dr. Horton    Most recent A1C:  5.7 (5/11/2022)    The following portions of the patient's history were reviewed and updated as appropriate: allergies, current medications, past family history, past medical history, past social history, past surgical history and problem list.      Review of Systems   Constitutional: Negative for fatigue.   Endocrine: Positive for polydipsia and polyphagia. Negative for polyuria.   Psychiatric/Behavioral: Positive for sleep disturbance.   All other systems reviewed and are negative.       Physical Exam  Vitals reviewed.   Constitutional:       Appearance: Normal appearance.   Cardiovascular:      Rate and Rhythm: Normal rate.   Pulmonary:      Effort: Pulmonary effort is normal.   Neurological:      Mental Status: She is alert and oriented to person, place, and time.   Psychiatric:         Mood and Affect: Mood normal.         Behavior: Behavior normal.         Thought Content: Thought content normal.         Judgment: Judgment normal.        /78 (BP Location: Right arm, Patient Position: Sitting, Cuff Size: Adult)   Pulse 97   Ht 162.6 cm (64\")   Wt 118 kg (259 lb 3.2 oz)   SpO2 96%   BMI 44.49 kg/m²      Labs and imaging    CMP:  Lab Results   Component Value Date     (H) 02/08/2022    BUN 11 02/08/2022    CREATININE 0.80 02/08/2022    EGFRIFNONA >60 02/08/2022    EGFRIFAFRI >60 02/08/2022    BCR 14 " 02/08/2022     02/08/2022    K 4.6 02/08/2022    CO2 26 02/08/2022    CALCIUM 8.8 (L) 02/08/2022    ALBUMIN 3.5 02/08/2022    BILITOT 0.3 02/08/2022    ALKPHOS 88 02/08/2022    AST 21 02/08/2022    ALT 22 02/08/2022     Lipid Panel:  No results found for: CHOL, TRIG, HDL, VLDL, LDL  HbA1c:  Lab Results   Component Value Date    HGBA1C 7.4 09/28/2022    HGBA1C 6.3 03/23/2022     Glucose:    Lab Results   Component Value Date    POCGLU 256 (A) 09/28/2022     Microalbumin:  No results found for: MALBCRERATIO  TSH:  No results found for: TSH    Assessment and plan  Diagnoses and all orders for this visit:    1. Type 2 diabetes mellitus with hyperglycemia, with long-term current use of insulin (HCC) (Primary)  Assessment & Plan:  -Diabetes is worsening with A1C up from 5.7 to 7.4.  -Discussed importance of dietary and exercise guidelines.  -Continue Toujeo 60 units qhs.   -Increase Metformin 500 mg BID.  -Continue Novolog 20 units TID with meals. Titration scale given to decrease dose as tolerable if hypos develop.  -Increase Trulicity to 4.5 mg weekly.  -Continue using Dexcom CGM.  2-week data download shows the following:  Very High: 26%  High: 39%  In Range: 35%  Low: 0%  Very Low: 0%  Trend shows some mild post-meal hyperglycemia.  -S/S hypoglycemia reviewed with Rule of 15's advised.  -Continue with improvements that have been made to diet and exercise regimen.  -Schedule a diabetic eye exam.  -Will consider adding on SGLT-2 in the future to aid in obtaining patient goals to reduce insulin use.  -Follow-up in 3 months.      Orders:  -     POC Glucose Fingerstick  -     POC Glycosylated Hemoglobin (Hb A1C)       Return in about 3 months (around 12/28/2022) for A1C, Follow-up appointment. The patient was instructed to contact the clinic with any interval questions or concerns.    This document has been electronically signed by JIN Jara  September 28, 2022 16:14 EDT  Endocrinology

## 2022-09-28 NOTE — PROGRESS NOTES
Specialty Pharmacy Patient Management Program  Endocrinology Reassessment     Michelle Estrada is a 52 y.o. female with Type 2 Diabetes seen by an Endocrinology Provider and enrolled in the Endocrinology Patient Management program offered by [facility]. A follow-up was conducted, including assessment of continued therapy appropriateness, medication adherence, and side effect incidence and management for Trulicity, insulin therapy, and metformin.    Patient is currently taking Novolog 15-20 units TID with meals, Toujeo 60 units at night, Trulicity 3 mg weekly, and Metformin  mg daily. She is doing well with current regimen and reports no side effects. She is using Dexcom to monitor BG with readings between 200-300s on average. She denies low BG <70. She has been under a lot of stress at home which has contributed to elevated BG.     Patient had surgery last week and had to hold her metformin and inuslin for 3 days. She missed her dose of Trulicty that week as well. She has resumed and reports no further issues with adherence.    Changes to Insurance Coverage or Financial Support  None  [facility] - DME pharmacy     Relevant Past Medical History and Comorbidities  Relevant medical history and concomitant health conditions were discussed with the patient. The patient's chart has been reviewed for relevant past medical history and comorbid health conditions and updated as necessary.   Past Medical History:   Diagnosis Date   • Anxiety    • Bipolar 1 disorder (HCC)    • COPD (chronic obstructive pulmonary disease) (HCC)    • Depression    • Diabetes mellitus (HCC)    • Fibromyalgia    • Gestational diabetes 2001   • Hypoglycemia 1994   • Neuropathy    • Polycystic ovary syndrome 2001   • PTSD (post-traumatic stress disorder)    • Vertigo    • Vitamin D deficiency ???     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 The patient's chart has been reviewed for allergy information and updated as necessary.   Dilantin [phenytoin], Lisinopril, and Other    Allergies reviewed by Tamara Meadows RPH on 9/28/2022 at  3:52 PM    Relevant Laboratory Values  A1C Last 3 Results    HGBA1C Last 3 Results 12/14/21 3/23/22 9/28/22   Hemoglobin A1C 7.3 6.3 7.4           Lab Results   Component Value Date    HGBA1C 7.4 09/28/2022     Lab Results   Component Value Date    GLUCOSE 94 01/30/2022    CALCIUM 8.8 (L) 02/08/2022     02/08/2022    K 4.6 02/08/2022    CO2 26 02/08/2022     02/08/2022    BUN 11 02/08/2022    CREATININE 0.80 02/08/2022    EGFRIFAFRI >60 02/08/2022    EGFRIFNONA >60 02/08/2022    BCR 14 02/08/2022    ANIONGAP 10 02/08/2022     No results found for: CHOL, CHLPL, TRIG, HDL, LDL, LDLDIRECT      Current Medication List  This medication list has been reviewed with the patient and evaluated for any interactions or necessary modifications/recommendations, and updated to include all prescription medications, OTC medications, and supplements the patient is currently taking.  This list reflects what is contained in the patient's profile, which has also been marked as reviewed to communicate to other providers it is the most up to date version of the patient's current medication " therapy.     Current Outpatient Medications:   •  aspirin 81 MG EC tablet, Take 81 mg by mouth Daily., Disp: , Rfl:   •  B-D ULTRAFINE III SHORT PEN 31G X 8 MM misc, , Disp: , Rfl:   •  budesonide-formoterol (SYMBICORT) 160-4.5 MCG/ACT inhaler, Inhale 2 puffs 2 (Two) Times a Day. PRN, Disp: , Rfl:   •  Continuous Blood Gluc  (Dexcom G6 ) device, 1 each Continuous., Disp: 1 each, Rfl: 0  •  Continuous Blood Gluc Sensor (Dexcom G6 Sensor), Every 10 (Ten) Days., Disp: 3 each, Rfl: 11  •  Continuous Blood Gluc Transmit (Dexcom G6 Transmitter) misc, 1 each Every 3 (Three) Months., Disp: 1 each, Rfl: 3  •  Dulaglutide (Trulicity) 3 MG/0.5ML solution pen-injector, Inject 0.5 mL under the skin into the appropriate area as directed 1 (One) Time Per Week., Disp: 6 mL, Rfl: 1  •  escitalopram (LEXAPRO) 10 MG tablet, Take 1 tablet by mouth Daily. (Patient taking differently: Take 20 mg by mouth Daily.), Disp: 90 tablet, Rfl: 0  •  fenofibrate (TRICOR) 145 MG tablet, Take 1 tablet by mouth Daily., Disp: 90 tablet, Rfl: 2  •  hydrOXYzine (ATARAX) 10 MG tablet, Daily As Needed., Disp: , Rfl:   •  insulin aspart (NovoLOG FlexPen) 100 UNIT/ML solution pen-injector sc pen, Inject 20 Units under the skin into the appropriate area as directed 3 (Three) Times a Day With Meals., Disp: 15 mL, Rfl: 5  •  Insulin Glargine, 2 Unit Dial, (Toujeo Max SoloStar) 300 UNIT/ML solution pen-injector injection, Inject 60 Units under the skin into the appropriate area as directed every night at bedtime., Disp: 15 mL, Rfl: 5  •  lamoTRIgine (LaMICtal) 25 MG tablet, Take 2 tablets by mouth 2 (two) times a day. (Patient taking differently: Take 100 mg by mouth Daily.), Disp: 360 tablet, Rfl: 0  •  magnesium oxide (MAGOX) 400 (241.3 Mg) MG tablet tablet, Take 400 mg by mouth Daily., Disp: , Rfl:   •  meclizine (ANTIVERT) 25 MG tablet, Take 25 mg by mouth 3 (Three) Times a Day., Disp: , Rfl:   •  metFORMIN ER (GLUCOPHAGE-XR) 500 MG 24 hr  tablet, Take 1 tablet by mouth every night at bedtime., Disp: 30 tablet, Rfl: 5  •  montelukast (SINGULAIR) 10 MG tablet, Take 10 mg by mouth., Disp: , Rfl:   •  predniSONE (DELTASONE) 10 MG (21) dose pack, , Disp: , Rfl:   •  Rivaroxaban (XARELTO) 2.5 MG tablet, Take 2.5 mg by mouth 2 (Two) Times a Day., Disp: , Rfl:   •  rosuvastatin (CRESTOR) 20 MG tablet, Take 1 tablet by mouth Daily., Disp: 30 tablet, Rfl: 5    Medicines reviewed by Tamara Meadows [facility] on 9/28/2022 at  3:54 PM    Drug Interactions  The hypoglycemic effect of Insulin may be increased by Fenofibrate.     Adverse Drug Reactions  • Adverse Reactions Experienced: None  • Plan for ADR Management: Not required    Hospitalizations and Urgent Care Since Last Assessment  • Hospitalizations or Admissions: None  • ED Visits: None  • Urgent Office Visits: None    Adherence and Self-Administration  Adherence related patient's specialty therapy was discussed with the patient. The Adherence segment of this outreach has been reviewed and updated.     • Ongoing or New Barriers to Patient Adherence and/or Self-Administration:None  • Methods for Supporting Patient Adherence and/or Self-Administration: None required     Goals of Therapy  Goals related to the patient's specialty therapy was discussed with the patient. The Patient Goals segment of this outreach has been reviewed and updated.    Goals     • Consistently take medications as prescribed     • HEMOGLOBIN A1C < 7          Reassessment Plan & Follow-Up  1. Medication Therapy Changes: Patient's diabetes has worsened with A1C up to 7.4% from 5.4%. Patient will increase to Trulicity 4.5 mg weekly and metformin 500 mg twice daily.    2. Additional Plans, Therapy Recommendations, or Therapy Problems to Be Addressed: Will send new prescriptions to pharmacy and mail out to patient.   3. Pharmacist to perform regular reassessments no more than (6) months from the previous assessment.  4. Care Coordinator to set up  future refill outreaches, coordinate prescription delivery, and escalate clinical questions to pharmacist.     Attestation  I attest that the specialty medication(s) addressed above are appropriate for the patient based on my reassessment.  If the prescribed therapy is at any point deemed not appropriate based on the current or future assessments, a consultation will be initiated with the patient's specialty care provider to determine the best course of action. The revised plan of therapy will be documented along with any additional patient education provided.     Tamara Meadows, PharmNEO  9/28/2022  16:08 EDT

## 2022-10-19 ENCOUNTER — SPECIALTY PHARMACY (OUTPATIENT)
Dept: PHARMACY | Facility: HOSPITAL | Age: 52
End: 2022-10-19

## 2022-11-14 ENCOUNTER — SPECIALTY PHARMACY (OUTPATIENT)
Dept: PHARMACY | Facility: HOSPITAL | Age: 52
End: 2022-11-14

## 2022-11-14 RX ORDER — LORATADINE 10 MG/1
10 TABLET ORAL DAILY
COMMUNITY

## 2022-11-14 NOTE — PROGRESS NOTES
Reviewed med change. Not an issue for diabetes management.     Tamara Meadows RPH  11/14/22  13:10 EST

## 2022-12-08 ENCOUNTER — SPECIALTY PHARMACY (OUTPATIENT)
Dept: PHARMACY | Facility: HOSPITAL | Age: 52
End: 2022-12-08

## 2022-12-28 ENCOUNTER — SPECIALTY PHARMACY (OUTPATIENT)
Dept: PHARMACY | Facility: HOSPITAL | Age: 52
End: 2022-12-28

## 2022-12-28 ENCOUNTER — OFFICE VISIT (OUTPATIENT)
Dept: ENDOCRINOLOGY | Facility: CLINIC | Age: 52
End: 2022-12-28

## 2022-12-28 VITALS
SYSTOLIC BLOOD PRESSURE: 128 MMHG | OXYGEN SATURATION: 96 % | HEART RATE: 87 BPM | WEIGHT: 257.4 LBS | BODY MASS INDEX: 43.94 KG/M2 | HEIGHT: 64 IN | DIASTOLIC BLOOD PRESSURE: 74 MMHG

## 2022-12-28 DIAGNOSIS — Z79.4 TYPE 2 DIABETES MELLITUS WITH HYPERGLYCEMIA, WITH LONG-TERM CURRENT USE OF INSULIN: Primary | ICD-10-CM

## 2022-12-28 DIAGNOSIS — L60.0 INGROWN TOENAIL OF BOTH FEET: ICD-10-CM

## 2022-12-28 DIAGNOSIS — E11.65 TYPE 2 DIABETES MELLITUS WITH HYPERGLYCEMIA, WITH LONG-TERM CURRENT USE OF INSULIN: Primary | ICD-10-CM

## 2022-12-28 LAB — GLUCOSE BLDC GLUCOMTR-MCNC: 237 MG/DL (ref 70–130)

## 2022-12-28 PROCEDURE — 99214 OFFICE O/P EST MOD 30 MIN: CPT | Performed by: NURSE PRACTITIONER

## 2022-12-28 PROCEDURE — 82962 GLUCOSE BLOOD TEST: CPT | Performed by: NURSE PRACTITIONER

## 2022-12-28 RX ORDER — INSULIN ASPART 100 [IU]/ML
20 INJECTION, SOLUTION INTRAVENOUS; SUBCUTANEOUS
Qty: 60 ML | Refills: 1 | Status: SHIPPED | OUTPATIENT
Start: 2022-12-28 | End: 2023-03-20 | Stop reason: SDUPTHER

## 2022-12-28 RX ORDER — METFORMIN HYDROCHLORIDE 500 MG/1
500 TABLET, EXTENDED RELEASE ORAL 2 TIMES DAILY
Qty: 180 TABLET | Refills: 1 | Status: SHIPPED | OUTPATIENT
Start: 2022-12-28 | End: 2023-03-20 | Stop reason: SDUPTHER

## 2022-12-28 RX ORDER — DULAGLUTIDE 4.5 MG/.5ML
4.5 INJECTION, SOLUTION SUBCUTANEOUS WEEKLY
Qty: 6 ML | Refills: 1 | Status: SHIPPED | OUTPATIENT
Start: 2022-12-28 | End: 2023-03-20 | Stop reason: SDUPTHER

## 2022-12-28 RX ORDER — ROSUVASTATIN CALCIUM 20 MG/1
20 TABLET, COATED ORAL DAILY
Qty: 90 TABLET | Refills: 1 | Status: SHIPPED | OUTPATIENT
Start: 2022-12-28

## 2022-12-28 RX ORDER — INSULIN GLARGINE 300 U/ML
60 INJECTION, SOLUTION SUBCUTANEOUS
Qty: 18 ML | Refills: 1 | Status: SHIPPED | OUTPATIENT
Start: 2022-12-28 | End: 2023-03-20 | Stop reason: SDUPTHER

## 2022-12-28 NOTE — PROGRESS NOTES
"Chief Complaint   Patient presents with   • Diabetes     F/u        Michelle Estrada is a 52 y.o. female had concerns including Diabetes (F/u).    T2DM.    She is checking blood sugars with Dexcom CGM.  Current medications for diabetes include Trulicity 4.5 mg weekly-tolerating this well, Novolog 20 units with meals, Toujeo 60 units qhs, Metformin 500 mg BID.  Hypos: occasionally  Last optho exam: 2022-Dr. Horton    Most recent A1C:  5.7 (5/11/2022)    She had COVID over Thanksgiving and has had increased BG's since then.    The following portions of the patient's history were reviewed and updated as appropriate: allergies, current medications, past family history, past medical history, past social history, past surgical history and problem list.      Review of Systems   Constitutional: Negative for fatigue.   Endocrine: Positive for polydipsia and polyphagia. Negative for polyuria.   Psychiatric/Behavioral: Positive for sleep disturbance.   All other systems reviewed and are negative.       Physical Exam  Vitals reviewed.   Constitutional:       Appearance: Normal appearance.   Cardiovascular:      Rate and Rhythm: Normal rate.   Pulmonary:      Effort: Pulmonary effort is normal.   Feet:      Comments: Bilateral ingrown great toenails.  Neurological:      Mental Status: She is alert and oriented to person, place, and time.   Psychiatric:         Mood and Affect: Mood normal.         Behavior: Behavior normal.         Thought Content: Thought content normal.         Judgment: Judgment normal.        /74 (BP Location: Left arm, Patient Position: Sitting, Cuff Size: Adult)   Pulse 87   Ht 162.6 cm (64\")   Wt 117 kg (257 lb 6.4 oz)   SpO2 96%   BMI 44.18 kg/m²      Labs and imaging    CMP:  Lab Results   Component Value Date     (H) 02/08/2022    BUN 11 02/08/2022    CREATININE 0.80 02/08/2022    EGFRIFNONA >60 02/08/2022    EGFRIFAFRI >60 02/08/2022    BCR 14 02/08/2022     02/08/2022    K 4.6 " 02/08/2022    CO2 26 02/08/2022    CALCIUM 8.8 (L) 02/08/2022    ALBUMIN 3.5 02/08/2022    BILITOT 0.3 02/08/2022    ALKPHOS 88 02/08/2022    AST 21 02/08/2022    ALT 22 02/08/2022     Lipid Panel:  No results found for: CHOL, TRIG, HDL, VLDL, LDL  HbA1c:  Lab Results   Component Value Date    HGBA1C 7.4 09/28/2022    HGBA1C 6.3 03/23/2022     Glucose:    Lab Results   Component Value Date    POCGLU 237 (A) 12/28/2022     Microalbumin:  No results found for: MALBCRERATIO  TSH:  No results found for: TSH    Assessment and plan  Diagnoses and all orders for this visit:    1. Type 2 diabetes mellitus with hyperglycemia, with long-term current use of insulin (HCC) (Primary)  Assessment & Plan:  -Diabetes remains the same with A1c 7.0 most recently.  -Discussed importance of dietary and exercise guidelines.  -Continue Toujeo 60 units qhs.   -Continue Metformin 500 mg BID.  -Continue Novolog 20 units TID with meals.   -Continue Trulicity to 4.5 mg weekly.  -Continue using [employer] CGM. Was not able to download in office today.  -S/S hypoglycemia reviewed with Rule of 15's advised.  -Continue with improvements that have been made to diet and exercise regimen.  -Schedule a diabetic eye exam.  -Will consider adding on SGLT-2 in the future to aid in obtaining patient goals to reduce insulin use.  -Follow-up in 3 months.     Orders:  -     POC Glucose Fingerstick    2. Ingrown toenail of both feet  Assessment & Plan:  Patient requesting referral to Podiatry in [place].    Orders:  -     Ambulatory Referral to Podiatry       Return in about 3 months (around 3/28/2023) for Follow-up appointment, A1C. The patient was instructed to contact the clinic with any interval questions or concerns.    This document has been electronically signed by JIN Jara  December 28, 2022 16:15 EST  Endocrinology

## 2022-12-28 NOTE — ASSESSMENT & PLAN NOTE
-Diabetes remains the same with A1c 7.0 most recently.  -Discussed importance of dietary and exercise guidelines.  -Continue Toujeo 60 units qhs.   -Continue Metformin 500 mg BID.  -Continue Novolog 20 units TID with meals.   -Continue Trulicity to 4.5 mg weekly.  -Continue using Dexcom CGM. Was not able to download in office today.  -S/S hypoglycemia reviewed with Rule of 15's advised.  -Continue with improvements that have been made to diet and exercise regimen.  -Schedule a diabetic eye exam.  -Will consider adding on SGLT-2 in the future to aid in obtaining patient goals to reduce insulin use.  -Follow-up in 3 months.

## 2022-12-28 NOTE — PROGRESS NOTES
Specialty Pharmacy Patient Management Program  Endocrinology Reassessment     Michelle Estrada is a 52 y.o. female with Type 2 Diabetes seen by an Endocrinology Provider and enrolled in the Endocrinology Patient Management program offered by [facility]. A follow-up was conducted, including assessment of continued therapy appropriateness, medication adherence, and side effect incidence and management for Trulicity, insulin therapy, and metformin.    Patient is currently taking Novolog 20 units TID with meals, Toujeo 60 units at night, Trulicity 4.5 mg weekly, and Metformin  mg twice daily. She is doing well with current regimen and reports no side effects. She is using Dexcom to monitor BG with readings between 200-300s on average. She denies low BG <70. She had COVID at the end of November which contributed to elevated BG.    Changes to Insurance Coverage or Financial Support  None  [employer] - DME pharmacy     Relevant Past Medical History and Comorbidities  Relevant medical history and concomitant health conditions were discussed with the patient. The patient's chart has been reviewed for relevant past medical history and comorbid health conditions and updated as necessary.   Past Medical History:   Diagnosis Date   • Anxiety    • Bipolar 1 disorder (HCC)    • COPD (chronic obstructive pulmonary disease) (HCC)    • Depression    • Diabetes mellitus (HCC)    • Fibromyalgia    • Gestational diabetes 2001   • Hypoglycemia 1994   • Neuropathy    • Polycystic ovary syndrome 2001   • PTSD (post-traumatic stress disorder)    • Vertigo    • Vitamin D deficiency ???     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 The patient's chart has been reviewed for allergy information and updated as necessary.   Dilantin [phenytoin], Lisinopril, and Other    Allergies reviewed by Tamara Meadows RPH on 12/28/2022 at  3:41 PM    Relevant Laboratory Values  A1C Last 3 Results    HGBA1C Last 3 Results 3/23/22 9/28/22   Hemoglobin A1C 6.3 7.4           Lab Results   Component Value Date    HGBA1C 7.4 09/28/2022     Lab Results   Component Value Date    GLUCOSE 94 01/30/2022    CALCIUM 8.8 (L) 02/08/2022     02/08/2022    K 4.6 02/08/2022    CO2 26 02/08/2022     02/08/2022    BUN 11 02/08/2022    CREATININE 0.80 02/08/2022    EGFRIFAFRI >60 02/08/2022    EGFRIFNONA >60 02/08/2022    BCR 14 02/08/2022    ANIONGAP 10 02/08/2022     No results found for: CHOL, CHLPL, TRIG, HDL, LDL, LDLDIRECT      Current Medication List  This medication list has been reviewed with the patient and evaluated for any interactions or necessary modifications/recommendations, and updated to include all prescription medications, OTC medications, and supplements the patient is currently taking.  This list reflects what is contained in the patient's profile, which has also been marked as reviewed to communicate to other providers it is the most up to date version of the patient's current medication therapy.     Current Outpatient Medications:   •  aspirin 81 MG EC tablet, Take 81 mg by mouth Daily., Disp: , Rfl:   •  budesonide-formoterol (SYMBICORT) 160-4.5 MCG/ACT inhaler, Inhale 2 puffs 2 (Two) Times a Day., " Disp: , Rfl:   •  Continuous Blood Gluc  (Dexcom G6 ) device, 1 each Continuous., Disp: 1 each, Rfl: 0  •  Continuous Blood Gluc Sensor (Dexcom G6 Sensor), Every 10 (Ten) Days., Disp: 3 each, Rfl: 11  •  Continuous Blood Gluc Transmit (Dexcom G6 Transmitter) misc, 1 each Every 3 (Three) Months., Disp: 1 each, Rfl: 3  •  Dulaglutide (Trulicity) 4.5 MG/0.5ML solution pen-injector, Inject 0.5 mL under the skin into the appropriate area as directed 1 (One) Time Per Week., Disp: 6 mL, Rfl: 1  •  escitalopram (LEXAPRO) 10 MG tablet, Take 1 tablet by mouth Daily. (Patient taking differently: Take 20 mg by mouth Daily.), Disp: 90 tablet, Rfl: 0  •  fenofibrate (TRICOR) 145 MG tablet, Take 1 tablet by mouth Daily., Disp: 90 tablet, Rfl: 2  •  hydrOXYzine (ATARAX) 10 MG tablet, Daily As Needed., Disp: , Rfl:   •  insulin aspart (NovoLOG FlexPen) 100 UNIT/ML solution pen-injector sc pen, Inject 20 Units under the skin into the appropriate area as directed 3 (Three) Times a Day With Meals., Disp: 15 mL, Rfl: 5  •  Insulin Glargine, 2 Unit Dial, (Toujeo Max SoloStar) 300 UNIT/ML solution pen-injector injection, Inject 60 Units under the skin at bedtime., Disp: 6 mL, Rfl: 5  •  Insulin Pen Needle 32G X 4 MM misc, Use to inject insulin as directed up to 4 times daily., Disp: 100 each, Rfl: 5  •  lamoTRIgine (LaMICtal) 25 MG tablet, Take 2 tablets by mouth 2 (two) times a day. (Patient taking differently: Take 100 mg by mouth Daily.), Disp: 360 tablet, Rfl: 0  •  loratadine (CLARITIN) 10 MG tablet, Take 1 tablet by mouth Daily., Disp: , Rfl:   •  meclizine (ANTIVERT) 25 MG tablet, Take 25 mg by mouth 3 (Three) Times a Day., Disp: , Rfl:   •  metFORMIN ER (GLUCOPHAGE-XR) 500 MG 24 hr tablet, Take 1 tablet by mouth 2 (Two) Times a Day., Disp: 180 tablet, Rfl: 1  •  montelukast (SINGULAIR) 10 MG tablet, Take 10 mg by mouth., Disp: , Rfl:   •  Rivaroxaban (XARELTO) 2.5 MG tablet, Take 2.5 mg by mouth 2 (Two) Times a  Day., Disp: , Rfl:   •  rosuvastatin (CRESTOR) 20 MG tablet, Take 1 tablet by mouth Daily., Disp: 30 tablet, Rfl: 5  •  magnesium oxide (MAGOX) 400 (241.3 Mg) MG tablet tablet, Take 400 mg by mouth Daily., Disp: , Rfl:     Medicines reviewed by Tamara Meadows [facility] on 12/28/2022 at  3:44 PM    Drug Interactions  The hypoglycemic effect of Insulin may be increased by Fenofibrate.     Adverse Drug Reactions  • Adverse Reactions Experienced: None  • Plan for ADR Management: Not required    Hospitalizations and Urgent Care Since Last Assessment  • Hospitalizations or Admissions: None  • ED Visits: None  • Urgent Office Visits: None    Adherence and Self-Administration  Adherence related patient's specialty therapy was discussed with the patient. The Adherence segment of this outreach has been reviewed and updated.     • Ongoing or New Barriers to Patient Adherence and/or Self-Administration:None  • Methods for Supporting Patient Adherence and/or Self-Administration: None required     Goals of Therapy  Goals related to the patient's specialty therapy was discussed with the patient. The Patient Goals segment of this outreach has been reviewed and updated.    Goals     • Consistently take medications as prescribed     • HEMOGLOBIN A1C < 7          Reassessment Plan & Follow-Up  1. Patient's diabetes has improved with most recent A1C down to 7% from 7.4%. No pharmacologic recommendations at this time.   2. Medication Therapy Changes: None discussed with patient.   3. Additional Plans, Therapy Recommendations, or Therapy Problems to Be Addressed: Will send updated prescriptions to [facility] for shipping services.   4. Pharmacist to perform regular reassessments no more than (6) months from the previous assessment.  5. Care Coordinator to set up future refill outreaches, coordinate prescription delivery, and escalate clinical questions to pharmacist.     Attestation   I attest that the specialty medication(s) addressed above  are appropriate for the patient based on my reassessment.  If the prescribed therapy is at any point deemed not appropriate based on the current or future assessments, a consultation will be initiated with the patient's specialty care provider to determine the best course of action. The revised plan of therapy will be documented along with any additional patient education provided.     Tamara Meadows, PharmD  12/28/2022  15:59 EST

## 2023-01-10 ENCOUNTER — SPECIALTY PHARMACY (OUTPATIENT)
Dept: PHARMACY | Facility: HOSPITAL | Age: 53
End: 2023-01-10
Payer: MEDICARE

## 2023-02-03 ENCOUNTER — SPECIALTY PHARMACY (OUTPATIENT)
Dept: PHARMACY | Facility: HOSPITAL | Age: 53
End: 2023-02-03
Payer: MEDICARE

## 2023-03-20 ENCOUNTER — SPECIALTY PHARMACY (OUTPATIENT)
Dept: PHARMACY | Facility: HOSPITAL | Age: 53
End: 2023-03-20
Payer: MEDICARE

## 2023-03-20 RX ORDER — METFORMIN HYDROCHLORIDE 500 MG/1
500 TABLET, EXTENDED RELEASE ORAL 2 TIMES DAILY
Qty: 180 TABLET | Refills: 1 | Status: SHIPPED | OUTPATIENT
Start: 2023-03-20

## 2023-03-20 RX ORDER — INSULIN ASPART 100 [IU]/ML
20 INJECTION, SOLUTION INTRAVENOUS; SUBCUTANEOUS
Qty: 60 ML | Refills: 1 | Status: SHIPPED | OUTPATIENT
Start: 2023-03-20

## 2023-03-20 RX ORDER — INSULIN GLARGINE 300 U/ML
60 INJECTION, SOLUTION SUBCUTANEOUS
Qty: 18 ML | Refills: 1 | Status: SHIPPED | OUTPATIENT
Start: 2023-03-20

## 2023-03-20 RX ORDER — FLUTICASONE PROPIONATE 50 MCG
2 SPRAY, SUSPENSION (ML) NASAL DAILY
COMMUNITY

## 2023-03-20 RX ORDER — TERBINAFINE HYDROCHLORIDE 250 MG/1
250 TABLET ORAL
COMMUNITY

## 2023-03-20 RX ORDER — DULAGLUTIDE 4.5 MG/.5ML
4.5 INJECTION, SOLUTION SUBCUTANEOUS WEEKLY
Qty: 6 ML | Refills: 1 | Status: SHIPPED | OUTPATIENT
Start: 2023-03-20

## 2023-03-20 NOTE — PROGRESS NOTES
Specialty Pharmacy Patient Management Program  Endocrinology Reassessment     Michelle Estrada is a 52 y.o. female with Type 2 Diabetes enrolled in the Endocrinology Patient Management program offered by [facility]. A follow-up was conducted, including assessment of continued therapy appropriateness, medication adherence, and side effect incidence and management for Trulicity, insulin therapy, and metformin.    Patient is currently taking Novolog 15-20 units TID with meals, Toujeo 50-60 units at night, Trulicity 4.5 mg weekly, and Metformin  mg twice daily. She is doing well with current regimen and reports no side effects. She denies low BG <70. She was started on a few new matinance medications since last visit which do not cause issues with diabetes management. She needs refills on her diabetes medications today.     Changes to Insurance Coverage or Financial Support  None  [facility] [place] [facility]     Relevant Past Medical History and Comorbidities  Relevant medical history and concomitant health conditions were discussed with the patient. The patient's chart has been reviewed for relevant past medical history and comorbid health conditions and updated as necessary.   Past Medical History:   Diagnosis Date   • Anxiety    • Bipolar 1 disorder (HCC)    • COPD (chronic obstructive pulmonary disease) (HCC)    • Depression    • Diabetes mellitus (HCC)    • Fibromyalgia    • Gestational diabetes 2001   • Hypoglycemia 1994   • Neuropathy    • Polycystic ovary syndrome 2001   • PTSD (post-traumatic stress disorder)    • Vertigo    • Vitamin D deficiency ???     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 The patient's chart has been reviewed for allergy information and updated as necessary.   Dilantin [phenytoin], Lisinopril, and Other    Allergies reviewed by Tamara Meadows RPH on 3/20/2023 at 11:57 AM    Relevant Laboratory Values  A1C Last 3 Results    HGBA1C Last 3 Results 9/28/22   Hemoglobin A1C 7.4           Lab Results   Component Value Date    HGBA1C 7.4 09/28/2022     Lab Results   Component Value Date    GLUCOSE 94 01/30/2022    CALCIUM 8.8 (L) 02/08/2022     02/08/2022    K 4.6 02/08/2022    CO2 26 02/08/2022     02/08/2022    BUN 11 02/08/2022    CREATININE 0.80 02/08/2022    EGFRIFAFRI >60 02/08/2022    EGFRIFNONA >60 02/08/2022    BCR 14 02/08/2022    ANIONGAP 10 02/08/2022     No results found for: CHOL, CHLPL, TRIG, HDL, LDL, LDLDIRECT      Current Medication List  This medication list has been reviewed with the patient and evaluated for any interactions or necessary modifications/recommendations, and updated to include all prescription medications, OTC medications, and supplements the patient is currently taking.  This list reflects what is contained in the patient's profile, which has also been marked as reviewed to communicate to other providers it is the most up to date version of the patient's current medication therapy.     Current Outpatient Medications:   •  aspirin 81 MG EC tablet, Take 1 tablet by mouth Daily., Disp: , Rfl:   •  budesonide-formoterol (SYMBICORT) 160-4.5 MCG/ACT inhaler, Inhale 2 puffs 2 (Two) Times a Day., Disp: , Rfl:   •  " Cariprazine HCl (Vraylar) 1.5 MG capsule capsule, Take 1 capsule by mouth Daily., Disp: , Rfl:   •  Dulaglutide (Trulicity) 4.5 MG/0.5ML solution pen-injector, Inject 0.5 mL under the skin into the appropriate area as directed 1 (One) Time Per Week., Disp: 6 mL, Rfl: 1  •  escitalopram (LEXAPRO) 10 MG tablet, Take 1 tablet by mouth Daily. (Patient taking differently: Take 2 tablets by mouth Daily.), Disp: 90 tablet, Rfl: 0  •  fenofibrate (TRICOR) 145 MG tablet, Take 1 tablet by mouth Daily., Disp: 90 tablet, Rfl: 2  •  fluticasone (FLONASE) 50 MCG/ACT nasal spray, 2 sprays into the nostril(s) as directed by provider Daily., Disp: , Rfl:   •  hydrOXYzine (ATARAX) 10 MG tablet, Daily As Needed., Disp: , Rfl:   •  insulin aspart (NovoLOG FlexPen) 100 UNIT/ML solution pen-injector sc pen, Inject 20 Units under the skin into the appropriate area as directed 3 (Three) Times a Day With Meals., Disp: 60 mL, Rfl: 1  •  Insulin Glargine, 2 Unit Dial, (Toujeo Max SoloStar) 300 UNIT/ML solution pen-injector injection, Inject 60 Units under the skin at bedtime., Disp: 18 mL, Rfl: 1  •  Insulin Pen Needle 32G X 4 MM misc, Use to inject insulin as directed up to 4 times daily., Disp: 100 each, Rfl: 5  •  lamoTRIgine (LaMICtal) 25 MG tablet, Take 2 tablets by mouth 2 (two) times a day. (Patient taking differently: Take 4 tablets by mouth Daily.), Disp: 360 tablet, Rfl: 0  •  loratadine (CLARITIN) 10 MG tablet, Take 1 tablet by mouth Daily., Disp: , Rfl:   •  magnesium oxide (MAGOX) 400 (241.3 Mg) MG tablet tablet, Take 1 tablet by mouth Daily., Disp: , Rfl:   •  meclizine (ANTIVERT) 25 MG tablet, Take 1 tablet by mouth 3 (Three) Times a Day., Disp: , Rfl:   •  metFORMIN ER (GLUCOPHAGE-XR) 500 MG 24 hr tablet, Take 1 tablet by mouth 2 (Two) Times a Day., Disp: 180 tablet, Rfl: 1  •  montelukast (SINGULAIR) 10 MG tablet, Take 1 tablet by mouth., Disp: , Rfl:   •  Rivaroxaban (XARELTO) 2.5 MG tablet, Take 1 tablet by mouth 2 (Two)  Times a Day., Disp: , Rfl:   •  rosuvastatin (CRESTOR) 20 MG tablet, Take 1 tablet by mouth Daily., Disp: 90 tablet, Rfl: 1  •  terbinafine (lamiSIL) 250 MG tablet, 1 tablet. Takes 1 tablet by mouth on the first 7 days of the month., Disp: , Rfl:   •  Continuous Blood Gluc  (Dexcom G6 ) device, 1 each Continuous. (Patient not taking: Reported on 3/20/2023), Disp: 1 each, Rfl: 0  •  Continuous Blood Gluc Sensor (Dexcom G6 Sensor), Every 10 (Ten) Days. (Patient not taking: Reported on 3/20/2023), Disp: 3 each, Rfl: 11  •  Continuous Blood Gluc Transmit (Dexcom G6 Transmitter) misc, 1 each Every 3 (Three) Months. (Patient not taking: Reported on 3/20/2023), Disp: 1 each, Rfl: 3    Medicines reviewed by Tamara Meadows [facility] on 3/20/2023 at 12:01 PM    Drug Interactions  The hypoglycemic effect of Insulin may be increased by Fenofibrate.     Adverse Drug Reactions  • Adverse Reactions Experienced: None  • Plan for ADR Management: Not required    Hospitalizations and Urgent Care Since Last Assessment  • Hospitalizations or Admissions: None  • ED Visits: None  • Urgent Office Visits: None    Adherence and Self-Administration  Adherence related patient's specialty therapy was discussed with the patient. The Adherence segment of this outreach has been reviewed and updated.     • Ongoing or New Barriers to Patient Adherence and/or Self-Administration:None  • Methods for Supporting Patient Adherence and/or Self-Administration: None required     Goals of Therapy  Goals related to the patient's specialty therapy was discussed with the patient. The Patient Goals segment of this outreach has been reviewed and updated.    Goals     • Consistently take medications as prescribed     • HEMOGLOBIN A1C < 7          Reassessment Plan & Follow-Up  1. Patient needs refill on diabetes medications today.   2. Medication Therapy Changes: None today. Continue current regimen.   3. Additional Plans, Therapy Recommendations, or  Therapy Problems to Be Addressed: Will send updated prescriptions to [facility] for shipping services.   4. Pharmacist to perform regular reassessments no more than (6) months from the previous assessment.  5. Care Coordinator to set up future refill outreaches, coordinate prescription delivery, and escalate clinical questions to pharmacist.     Attestation   I attest that the specialty medication(s) addressed above are appropriate for the patient based on my reassessment.  If the prescribed therapy is at any point deemed not appropriate based on the current or future assessments, a consultation will be initiated with the patient's specialty care provider to determine the best course of action. The revised plan of therapy will be documented along with any additional patient education provided.     Tamara Meadows, PharmD, [facility]  Clinical Specialty Pharmacist, Endocrinology  3/20/2023  12:11 EDT

## 2023-03-27 RX ORDER — FENOFIBRATE 145 MG/1
145 TABLET, COATED ORAL DAILY
Qty: 90 TABLET | Refills: 2 | OUTPATIENT
Start: 2023-03-27

## 2023-04-05 ENCOUNTER — SPECIALTY PHARMACY (OUTPATIENT)
Dept: PHARMACY | Facility: HOSPITAL | Age: 53
End: 2023-04-05
Payer: MEDICARE

## 2023-04-25 ENCOUNTER — SPECIALTY PHARMACY (OUTPATIENT)
Dept: PHARMACY | Facility: HOSPITAL | Age: 53
End: 2023-04-25
Payer: MEDICARE

## 2023-08-07 ENCOUNTER — SPECIALTY PHARMACY (OUTPATIENT)
Dept: PHARMACY | Facility: HOSPITAL | Age: 53
End: 2023-08-07
Payer: MEDICARE

## 2023-08-22 ENCOUNTER — SPECIALTY PHARMACY (OUTPATIENT)
Dept: PHARMACY | Facility: HOSPITAL | Age: 53
End: 2023-08-22
Payer: MEDICARE

## 2023-08-22 ENCOUNTER — OFFICE VISIT (OUTPATIENT)
Dept: ENDOCRINOLOGY | Facility: CLINIC | Age: 53
End: 2023-08-22
Payer: MEDICARE

## 2023-08-22 VITALS
HEIGHT: 64 IN | DIASTOLIC BLOOD PRESSURE: 58 MMHG | WEIGHT: 254 LBS | BODY MASS INDEX: 43.36 KG/M2 | HEART RATE: 93 BPM | OXYGEN SATURATION: 96 % | SYSTOLIC BLOOD PRESSURE: 128 MMHG

## 2023-08-22 DIAGNOSIS — E11.65 TYPE 2 DIABETES MELLITUS WITH HYPERGLYCEMIA, WITH LONG-TERM CURRENT USE OF INSULIN: Primary | ICD-10-CM

## 2023-08-22 DIAGNOSIS — Z79.4 TYPE 2 DIABETES MELLITUS WITH HYPERGLYCEMIA, WITH LONG-TERM CURRENT USE OF INSULIN: Primary | ICD-10-CM

## 2023-08-22 LAB
EXPIRATION DATE: NORMAL
GLUCOSE BLDC GLUCOMTR-MCNC: 444 MG/DL (ref 70–130)
HBA1C MFR BLD: 9.9 %
Lab: NORMAL

## 2023-08-22 PROCEDURE — 1160F RVW MEDS BY RX/DR IN RCRD: CPT | Performed by: NURSE PRACTITIONER

## 2023-08-22 PROCEDURE — 83036 HEMOGLOBIN GLYCOSYLATED A1C: CPT | Performed by: NURSE PRACTITIONER

## 2023-08-22 PROCEDURE — 1159F MED LIST DOCD IN RCRD: CPT | Performed by: NURSE PRACTITIONER

## 2023-08-22 PROCEDURE — 99214 OFFICE O/P EST MOD 30 MIN: CPT | Performed by: NURSE PRACTITIONER

## 2023-08-22 PROCEDURE — 82947 ASSAY GLUCOSE BLOOD QUANT: CPT | Performed by: NURSE PRACTITIONER

## 2023-08-22 PROCEDURE — 3046F HEMOGLOBIN A1C LEVEL >9.0%: CPT | Performed by: NURSE PRACTITIONER

## 2023-08-22 NOTE — PROGRESS NOTES
"Chief Complaint   Patient presents with    Diabetes        Michelle Estrada is a 53 y.o. female had concerns including Diabetes.    T2DM.    She is checking blood sugars with Dexcom CGM.  Current medications for diabetes include Trulicity 4.5 mg weekly-tolerating this well, Novolog 30 units with meals, Toujeo 64 units qhs, Metformin 500 mg BID.  She has not been taking her medication regularly, she is missing 4-6 days per week of medication.   Hypos: occasionally  Last optho exam: 2022-Dr. Horton    Most recent A1C:  5.7 (5/11/2022)    She has been dealing with some psychiatric issues and has been missing multiple doses of her medication.  She is working with her psychiatrist for treatment of this.  She is working with her children on ways to help her remember to take her medication.    The following portions of the patient's history were reviewed and updated as appropriate: allergies, current medications, past family history, past medical history, past social history, past surgical history and problem list.      Review of Systems   Constitutional:  Negative for fatigue.   Endocrine: Positive for polydipsia and polyphagia. Negative for polyuria.   Psychiatric/Behavioral:  Positive for sleep disturbance.    All other systems reviewed and are negative.     Physical Exam  Vitals reviewed.   Constitutional:       Appearance: Normal appearance.   Cardiovascular:      Rate and Rhythm: Normal rate.   Pulmonary:      Effort: Pulmonary effort is normal.   Feet:      Comments: Bilateral ingrown great toenails.  Neurological:      Mental Status: She is alert and oriented to person, place, and time.   Psychiatric:         Mood and Affect: Mood normal.         Behavior: Behavior normal.         Thought Content: Thought content normal.         Judgment: Judgment normal.      /58 (BP Location: Left arm, Patient Position: Sitting, Cuff Size: Adult)   Pulse 93   Ht 162.6 cm (64\")   Wt 115 kg (254 lb)   SpO2 96%   BMI 43.60 " kg/mý      Labs and imaging    CMP:  Lab Results   Component Value Date     (H) 02/08/2022    BUN 11 02/08/2022    CREATININE 0.80 02/08/2022    EGFRIFNONA >60 02/08/2022    EGFRIFAFRI >60 02/08/2022    BCR 14 02/08/2022     02/08/2022    K 4.6 02/08/2022    CO2 26 02/08/2022    CALCIUM 8.8 (L) 02/08/2022    ALBUMIN 3.5 02/08/2022    BILITOT 0.3 02/08/2022    ALKPHOS 88 02/08/2022    AST 21 02/08/2022    ALT 22 02/08/2022     Lipid Panel:  No results found for: CHOL, TRIG, HDL, VLDL, LDL  HbA1c:  Lab Results   Component Value Date    HGBA1C 9.9 08/22/2023    HGBA1C 7.4 09/28/2022     Glucose:      Lab Results   Component Value Date    POCGLU 444 (A) 08/22/2023     Microalbumin:  No results found for: MALBCRERATIO  TSH:  No results found for: TSH    Assessment and plan  Diagnoses and all orders for this visit:    1. Type 2 diabetes mellitus with hyperglycemia, with long-term current use of insulin (Primary)  Assessment & Plan:  -Diabetes worsening with A1c up from 7.4 to 9.9.  -Discussed importance of dietary and exercise guidelines.  -Discussed importance of not missing doses of medication with patient.  She and her daughter are working together with setting alarms and notifying herself of when her medication is due so that she does not miss doses of it.  Discussed the long-term side effects of uncontrolled diabetes with patient and daughter.  -Continue Toujeo 60 units qhs.   -Continue Metformin 500 mg BID.  -Continue Novolog 20 units TID with meals.   -Continue Trulicity 4.5 mg weekly.  -Continue using Dexcom CGM.   -Patient's blood sugar was so high in office that it was unable to be read on our glucometer.  Patient was administered 15 units of insulin aspart in office and instructed to inject 10 units every 4 hours until blood sugar 250 or less.  Instructed to increase water intake.  -S/S hypoglycemia reviewed with Rule of 15's advised.  -Continue with improvements that have been made to diet and  exercise regimen.  -Schedule a diabetic eye exam.  -Follow-up at the end of the week to ensure that she is starting on a path to taking her medication regularly.    Orders:  -     POC Glucose, Blood  -     POC Glycosylated Hemoglobin (Hb A1C)         Return in about 3 days (around 8/25/2023) for Follow-up appointment. The patient was instructed to contact the clinic with any interval questions or concerns.    This document has been electronically signed by JIN Jara  August 24, 2023 16:56 EDT  Endocrinology

## 2023-08-22 NOTE — PROGRESS NOTES
Specialty Pharmacy Patient Management Program  Endocrinology Reassessment     Michelle Estrada is a 53 y.o. female with Type 2 Diabetes enrolled in the Endocrinology Patient Management program offered by [facility]. A follow-up was conducted, including assessment of continued therapy appropriateness, medication adherence, and side effect incidence and management for Trulicity, insulin therapy, and metformin.    Patient is currently taking Novolog 15-20 units TID with meals, Toujeo 50-60 units at night, Trulicity 4.5 mg weekly, and Metformin  mg twice daily. She is doing well with current regimen and reports no side effects. She denies low BG <70. She was started on a few new matinance medications since last visit which do not cause issues with diabetes management. She needs refills on her diabetes medications today.     Changes to Insurance Coverage or Financial Support  None  [facility] [place] [facility]     Relevant Past Medical History and Comorbidities  Relevant medical history and concomitant health conditions were discussed with the patient. The patient's chart has been reviewed for relevant past medical history and comorbid health conditions and updated as necessary.   Past Medical History:   Diagnosis Date    Anxiety     Bipolar 1 disorder     COPD (chronic obstructive pulmonary disease)     Depression     Diabetes mellitus     Fibromyalgia     Gestational diabetes 2001    Hypoglycemia 1994    Neuropathy     Polycystic ovary syndrome 2001    PTSD (post-traumatic stress disorder)     Vertigo     Vitamin D deficiency ???     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 The patient's chart has been reviewed for allergy information and updated as necessary.   Dilantin [phenytoin], Lisinopril, and Other    Allergies reviewed by Tamara Meadows RPH on 8/22/2023 at  3:29 PM    Relevant Laboratory Values  A1C Last 3 Results          9/28/2022    15:53 8/22/2023    15:31   HGBA1C Last 3 Results   Hemoglobin A1C 7.4  9.9      Lab Results   Component Value Date    HGBA1C 9.9 08/22/2023     Lab Results   Component Value Date    GLUCOSE 94 01/30/2022    CALCIUM 8.8 (L) 02/08/2022     02/08/2022    K 4.6 02/08/2022    CO2 26 02/08/2022     02/08/2022    BUN 11 02/08/2022    CREATININE 0.80 02/08/2022    EGFRIFAFRI >60 02/08/2022    EGFRIFNONA >60 02/08/2022    BCR 14 02/08/2022    ANIONGAP 10 02/08/2022     No results found for: CHOL, CHLPL, TRIG, HDL, LDL, LDLDIRECT      Current Medication List  This medication list has been reviewed with the patient and evaluated for any interactions or necessary modifications/recommendations, and updated to include all prescription medications, OTC medications, and supplements the patient is currently taking.  This list reflects what is contained in the patient's profile, which has also been marked as reviewed to communicate to other providers it is the most up to date version of the patient's current medication therapy.     Current Outpatient Medications:     aspirin 81 MG EC tablet, Take 1 tablet by mouth Daily., Disp: , Rfl:     budesonide-formoterol (SYMBICORT) 160-4.5 MCG/ACT inhaler, Inhale 2 puffs 2 (Two) Times a Day., Disp: , Rfl:     " Cariprazine HCl (VRAYLAR) 1.5 MG capsule capsule, Take 1 capsule by mouth Daily., Disp: , Rfl:     Dulaglutide (Trulicity) 4.5 MG/0.5ML solution pen-injector, Inject 0.5 mL under the skin into the appropriate area as directed 1 (One) Time Per Week., Disp: 6 mL, Rfl: 1    escitalopram (LEXAPRO) 10 MG tablet, Take 1 tablet by mouth Daily. (Patient taking differently: Take 2 tablets by mouth Daily.), Disp: 90 tablet, Rfl: 0    fenofibrate (TRICOR) 145 MG tablet, Take 1 tablet by mouth Daily., Disp: 90 tablet, Rfl: 2    fluticasone (FLONASE) 50 MCG/ACT nasal spray, 2 sprays into the nostril(s) as directed by provider Daily., Disp: , Rfl:     hydrOXYzine (ATARAX) 10 MG tablet, Daily As Needed., Disp: , Rfl:     insulin aspart (NovoLOG FlexPen) 100 UNIT/ML solution pen-injector sc pen, Inject 20 Units under the skin into the appropriate area as directed 3 (Three) Times a Day With Meals. (Patient taking differently: Inject 30 Units under the skin into the appropriate area as directed 3 (Three) Times a Day With Meals.), Disp: 60 mL, Rfl: 1    Insulin Glargine, 2 Unit Dial, (Toujeo Max SoloStar) 300 UNIT/ML solution pen-injector injection, Inject 60 Units under the skin at bedtime. (Patient taking differently: Inject 64 Units under the skin into the appropriate area as directed every night at bedtime.), Disp: 18 mL, Rfl: 1    Insulin Pen Needle 32G X 4 MM misc, Use to inject insulin as directed up to 4 times daily., Disp: 100 each, Rfl: 5    lamoTRIgine (LaMICtal) 25 MG tablet, Take 2 tablets by mouth 2 (two) times a day. (Patient taking differently: Take 4 tablets by mouth Daily.), Disp: 360 tablet, Rfl: 0    loratadine (CLARITIN) 10 MG tablet, Take 1 tablet by mouth Daily., Disp: , Rfl:     magnesium oxide (MAGOX) 400 (241.3 Mg) MG tablet tablet, Take 1 tablet by mouth Daily., Disp: , Rfl:     meclizine (ANTIVERT) 25 MG tablet, Take 1 tablet by mouth 2 (Two) Times a Day., Disp: , Rfl:     metFORMIN ER (GLUCOPHAGE-XR) 500  MG 24 hr tablet, Take 1 tablet by mouth 2 (Two) Times a Day., Disp: 180 tablet, Rfl: 1    montelukast (SINGULAIR) 10 MG tablet, Take 1 tablet by mouth., Disp: , Rfl:     Rivaroxaban (XARELTO) 2.5 MG tablet, Take 1 tablet by mouth 2 (Two) Times a Day., Disp: , Rfl:     rosuvastatin (CRESTOR) 20 MG tablet, Take 1 tablet by mouth Daily., Disp: 90 tablet, Rfl: 1    terbinafine (lamiSIL) 250 MG tablet, 1 tablet. Takes 1 tablet by mouth on the first 7 days of the month., Disp: , Rfl:     Continuous Blood Gluc  (Dexcom G6 ) device, 1 each Continuous. (Patient not taking: Reported on 3/20/2023), Disp: 1 each, Rfl: 0    Continuous Blood Gluc Sensor (Dexcom G6 Sensor), Every 10 (Ten) Days. (Patient not taking: Reported on 3/20/2023), Disp: 3 each, Rfl: 11    Continuous Blood Gluc Transmit (Dexcom G6 Transmitter) misc, 1 each Every 3 (Three) Months. (Patient not taking: Reported on 3/20/2023), Disp: 1 each, Rfl: 3    Medicines reviewed by Tamara Meadows, [facility] on 8/22/2023 at  3:35 PM    Drug Interactions  The hypoglycemic effect of Insulin may be increased by Fenofibrate.     Adverse Drug Reactions  Adverse Reactions Experienced: None  Plan for ADR Management: Not required    Hospitalizations and Urgent Care Since Last Assessment  Hospitalizations or Admissions: None  ED Visits: None  Urgent Office Visits: None    Adherence and Self-Administration  Adherence related patient's specialty therapy was discussed with the patient. The Adherence segment of this outreach has been reviewed and updated.     Ongoing or New Barriers to Patient Adherence and/or Self-Administration:None  Methods for Supporting Patient Adherence and/or Self-Administration: None required     Goals of Therapy  Goals related to the patient's specialty therapy was discussed with the patient. The Patient Goals segment of this outreach has been reviewed and updated.    Goals        Consistently take medications as prescribed      HEMOGLOBIN A1C < 7       Specialty Pharmacy General Goal      Prevent hypoglycemia             Reassessment Plan & Follow-Up  Patient's diabetes is uncontrolled with A1C up to 9.9%.   Medication Therapy Changes: None today, provider made adjustments to her insulin regimen today and she will follow-up with patient in clinic on Friday.   Additional Plans, Therapy Recommendations, or Therapy Problems to Be Addressed: None.   Pharmacist to perform regular reassessments no more than (6) months from the previous assessment.  Care Coordinator to set up future refill outreaches, coordinate prescription delivery, and escalate clinical questions to pharmacist.     Attestation   I attest that the specialty medication(s) addressed above are appropriate for the patient based on my reassessment.  If the prescribed therapy is at any point deemed not appropriate based on the current or future assessments, a consultation will be initiated with the patient's specialty care provider to determine the best course of action. The revised plan of therapy will be documented along with any additional patient education provided.     Tamara Meadows, PharmD, [facility]  Clinical Specialty Pharmacist, Endocrinology  8/22/2023  16:24 EDT

## 2023-08-24 NOTE — ASSESSMENT & PLAN NOTE
-Diabetes worsening with A1c up from 7.4 to 9.9.  -Discussed importance of dietary and exercise guidelines.  -Discussed importance of not missing doses of medication with patient.  She and her daughter are working together with setting alarms and notifying herself of when her medication is due so that she does not miss doses of it.  Discussed the long-term side effects of uncontrolled diabetes with patient and daughter.  -Continue Toujeo 60 units qhs.   -Continue Metformin 500 mg BID.  -Continue Novolog 20 units TID with meals.   -Continue Trulicity 4.5 mg weekly.  -Continue using Dexcom CGM.   -Patient's blood sugar was so high in office that it was unable to be read on our glucometer.  Patient was administered 15 units of insulin aspart in office and instructed to inject 10 units every 4 hours until blood sugar 250 or less.  Instructed to increase water intake.  -S/S hypoglycemia reviewed with Rule of 15's advised.  -Continue with improvements that have been made to diet and exercise regimen.  -Schedule a diabetic eye exam.  -Follow-up at the end of the week to ensure that she is starting on a path to taking her medication regularly.

## 2023-08-25 ENCOUNTER — OFFICE VISIT (OUTPATIENT)
Dept: ENDOCRINOLOGY | Facility: CLINIC | Age: 53
End: 2023-08-25
Payer: MEDICARE

## 2023-08-25 VITALS
DIASTOLIC BLOOD PRESSURE: 60 MMHG | HEIGHT: 64 IN | OXYGEN SATURATION: 95 % | BODY MASS INDEX: 41.18 KG/M2 | WEIGHT: 241.2 LBS | HEART RATE: 98 BPM | SYSTOLIC BLOOD PRESSURE: 110 MMHG

## 2023-08-25 DIAGNOSIS — Z79.4 TYPE 2 DIABETES MELLITUS WITH HYPERGLYCEMIA, WITH LONG-TERM CURRENT USE OF INSULIN: Primary | ICD-10-CM

## 2023-08-25 DIAGNOSIS — E11.65 TYPE 2 DIABETES MELLITUS WITH HYPERGLYCEMIA, WITH LONG-TERM CURRENT USE OF INSULIN: Primary | ICD-10-CM

## 2023-08-25 LAB
EXPIRATION DATE: ABNORMAL
GLUCOSE BLDC GLUCOMTR-MCNC: 187 MG/DL (ref 70–130)
Lab: ABNORMAL

## 2023-08-25 RX ORDER — INSULIN PMP CART,AUT,G6/7,CNTR
1 EACH SUBCUTANEOUS TAKE AS DIRECTED
Qty: 1 KIT | Refills: 0 | Status: SHIPPED | OUTPATIENT
Start: 2023-08-25

## 2023-08-25 NOTE — ASSESSMENT & PLAN NOTE
-Diabetes is stable with improving BG's.  -Discussed importance of dietary and exercise guidelines.  -Discussed importance of not missing doses of medication with patient.  She and her daughter are working together with setting alarms and notifying herself of when her medication is due so that she does not miss doses of it.  Discussed the long-term side effects of uncontrolled diabetes with patient and daughter.  -Continue Toujeo 60 units qhs.   -Continue Metformin 500 mg BID.  -Continue Novolog 20 units TID with meals.   -Continue Trulicity 4.5 mg weekly.  -Continue using Dexcom CGM.   -Patient is interested in using a insulin pump.  Will send in RX for this and send her information over the the  to have this completed.  -Continue with improvements that have been made to diet and exercise regimen.  -Schedule a diabetic eye exam.  -Follow-up in 3 months when A1c due.

## 2023-08-25 NOTE — PROGRESS NOTES
Chief Complaint   Patient presents with    Diabetes        Michelle Estrada is a 53 y.o. female had concerns including Diabetes.    T2DM.    She is checking blood sugars with Dexcom CGM.  Current medications for diabetes include Trulicity 4.5 mg weekly-tolerating this well, Novolog 30 units with meals, Toujeo 64 units qhs, Metformin 500 mg BID.  She has not been taking her medication regularly, she is missing 4-6 days per week of medication.   Hypos: occasionally  Last optho exam: 2022-Dr. Horton    Most recent A1C:  5.7 (5/11/2022)    She has been taking her medication regularly since her last visit.  Her BG's that she brought in today are ranging from 150-250.  She denies any overt hyper/hypo events.  She informs that she and her children have discussed the use of an insulin pump and they feel like this would be best for her.  She is waiting on her Dexcom Transmitter to come from the DME company to place that back on and has been doing FSBG's since.  She has been having some increased nausea/vomiting that has been going on intermittently over the last 3-4 months. She reports that she does not feel like this is related to her GLP-1 agonist as she has been on this for years.    The following portions of the patient's history were reviewed and updated as appropriate: allergies, current medications, past family history, past medical history, past social history, past surgical history and problem list.      Review of Systems   Constitutional:  Negative for fatigue.   Gastrointestinal:  Positive for nausea and vomiting.   Endocrine: Positive for polydipsia and polyphagia. Negative for polyuria.   Psychiatric/Behavioral:  Positive for sleep disturbance.    All other systems reviewed and are negative.     Physical Exam  Vitals reviewed.   Constitutional:       Appearance: Normal appearance.   Cardiovascular:      Rate and Rhythm: Normal rate.   Pulmonary:      Effort: Pulmonary effort is normal.   Feet:      Comments:  "Bilateral ingrown great toenails.  Neurological:      Mental Status: She is alert and oriented to person, place, and time.   Psychiatric:         Mood and Affect: Mood normal.         Behavior: Behavior normal.         Thought Content: Thought content normal.         Judgment: Judgment normal.      /60 (BP Location: Left arm, Patient Position: Sitting, Cuff Size: Infant)   Pulse 98   Ht 162.6 cm (64\")   Wt 109 kg (241 lb 3.2 oz)   SpO2 95%   BMI 41.40 kg/mý      Labs and imaging    CMP:  Lab Results   Component Value Date     (H) 02/08/2022    BUN 11 02/08/2022    CREATININE 0.80 02/08/2022    EGFRIFNONA >60 02/08/2022    EGFRIFAFRI >60 02/08/2022    BCR 14 02/08/2022     02/08/2022    K 4.6 02/08/2022    CO2 26 02/08/2022    CALCIUM 8.8 (L) 02/08/2022    ALBUMIN 3.5 02/08/2022    BILITOT 0.3 02/08/2022    ALKPHOS 88 02/08/2022    AST 21 02/08/2022    ALT 22 02/08/2022     Lipid Panel:  No results found for: CHOL, TRIG, HDL, VLDL, LDL  HbA1c:  Lab Results   Component Value Date    HGBA1C 9.9 08/22/2023    HGBA1C 7.4 09/28/2022     Glucose:      Lab Results   Component Value Date    POCGLU 187 (A) 08/25/2023     Microalbumin:  No results found for: MALBCRERATIO  TSH:  No results found for: TSH    Assessment and plan  Diagnoses and all orders for this visit:    1. Type 2 diabetes mellitus with hyperglycemia, with long-term current use of insulin (Primary)  Assessment & Plan:  -Diabetes is stable with improving BG's.  -Discussed importance of dietary and exercise guidelines.  -Discussed importance of not missing doses of medication with patient.  She and her daughter are working together with setting alarms and notifying herself of when her medication is due so that she does not miss doses of it.  Discussed the long-term side effects of uncontrolled diabetes with patient and daughter.  -Continue Toujeo 60 units qhs.   -Continue Metformin 500 mg BID.  -Continue Novolog 20 units TID with meals. "   -Continue Trulicity 4.5 mg weekly.  -Continue using Dexcom CGM.   -Patient is interested in using a insulin pump.  Will send in RX for this and send her information over the the  to have this completed.  -Continue with improvements that have been made to diet and exercise regimen.  -Schedule a diabetic eye exam.  -Follow-up in 3 months when A1c due.    Orders:  -     POC Glucose, Blood         Return in about 3 months (around 11/25/2023) for Follow-up appointment, A1C. The patient was instructed to contact the clinic with any interval questions or concerns.    This document has been electronically signed by JIN Jara  August 25, 2023 08:27 EDT  Endocrinology

## 2023-09-28 ENCOUNTER — SPECIALTY PHARMACY (OUTPATIENT)
Dept: PHARMACY | Facility: HOSPITAL | Age: 53
End: 2023-09-28
Payer: MEDICARE

## 2023-09-28 NOTE — PROGRESS NOTES
Contacted the patient to conduct an adherence follow up.   Patient reports she is doing well with her medications and has not missing doses. Patient reports family assistance and consistent use of a pill box has been helpful. No further adherence issues noted at this time.     Patient reported that she is experiencing connectivity issues. Pump has been disconnecting from CGM several times per day. She is unsure if this is an issue with the CGM or the pump.   Patient reports she has issues with cell service but the connectivity issues have improved when she uses her WiFi.     Spoke to JIN Guevara regarding connectivity issues. She advised the patient should contact [employer] first to attempt and troubleshoot the issue. If this is unsuccessful, the patient should then contact "[employer]. Patient reports she will contact the company and contact the clinic with any further issues.     Thank you,    Teresa Hines, PharmD  [facility] PGY1 Pharmacy Resident  [facility]  09/28/23  13:52 EDT

## 2023-10-04 ENCOUNTER — SPECIALTY PHARMACY (OUTPATIENT)
Dept: PHARMACY | Facility: HOSPITAL | Age: 53
End: 2023-10-04
Payer: MEDICARE

## 2023-10-04 DIAGNOSIS — Z79.4 TYPE 2 DIABETES MELLITUS WITH HYPERGLYCEMIA, WITH LONG-TERM CURRENT USE OF INSULIN: Primary | ICD-10-CM

## 2023-10-04 DIAGNOSIS — E11.65 TYPE 2 DIABETES MELLITUS WITH HYPERGLYCEMIA, WITH LONG-TERM CURRENT USE OF INSULIN: Primary | ICD-10-CM

## 2023-10-04 RX ORDER — INSULIN PMP CART,AUT,G6/7,CNTR
1 EACH SUBCUTANEOUS EVERY OTHER DAY
Qty: 15 EACH | Refills: 5 | Status: SHIPPED | OUTPATIENT
Start: 2023-10-04

## 2023-10-04 NOTE — PROGRESS NOTES
Patient called to request refills on her OmniPod 5 insulin pump pods. She is doing well with the insulin pump at this time. Will send RX for pods to [employer] for shipping services.     Thank you,     Tamara Meadows, PharmD, [facility] [place], [place]  Clinical Specialty Pharmacist, Endocrinology  10/04/23  11:32 EDT

## 2023-10-19 ENCOUNTER — SPECIALTY PHARMACY (OUTPATIENT)
Dept: PHARMACY | Facility: HOSPITAL | Age: 53
End: 2023-10-19
Payer: MEDICARE

## 2023-10-19 DIAGNOSIS — E11.65 TYPE 2 DIABETES MELLITUS WITH HYPERGLYCEMIA, WITH LONG-TERM CURRENT USE OF INSULIN: Primary | ICD-10-CM

## 2023-10-19 DIAGNOSIS — Z79.4 TYPE 2 DIABETES MELLITUS WITH HYPERGLYCEMIA, WITH LONG-TERM CURRENT USE OF INSULIN: Primary | ICD-10-CM

## 2023-10-19 RX ORDER — METFORMIN HYDROCHLORIDE 500 MG/1
500 TABLET, EXTENDED RELEASE ORAL 2 TIMES DAILY
Qty: 180 TABLET | Refills: 1 | Status: SHIPPED | OUTPATIENT
Start: 2023-10-19

## 2023-10-19 RX ORDER — INSULIN ASPART 100 [IU]/ML
INJECTION, SOLUTION INTRAVENOUS; SUBCUTANEOUS
Qty: 70 ML | Refills: 5 | Status: SHIPPED | OUTPATIENT
Start: 2023-10-19

## 2023-10-19 NOTE — PROGRESS NOTES
"   Specialty Pharmacy Patient Management Program  Endocrinology Reassessment     Michelle Estrada is a 53 y.o. female with Type 2 Diabetes enrolled in the Endocrinology Patient Management program offered by [facility]. A follow-up was conducted, including assessment of continued therapy appropriateness, medication adherence, and side effect incidence and management for Trulicity, insulin pump therapy, and metformin.    Patient is currently using Novolog in OmniPod 5 insulin pump, Trulicity 4.5 mg weekly, and Metformin  mg twice daily. She is doing well with current regimen and denies side effects. She denies low BG <70. She needs refills on metformin today and needs to switch to Novolog vials for use in insulin pump.     Changes to Insurance Coverage or Financial Support  None  [facility]     Relevant Past Medical History and Comorbidities  Relevant medical history and concomitant health conditions were discussed with the patient. The patient's chart has been reviewed for relevant past medical history and comorbid health conditions and updated as necessary.   Past Medical History:   Diagnosis Date    Anxiety     Bipolar 1 disorder     COPD (chronic obstructive pulmonary disease)     Depression     Diabetes mellitus     Fibromyalgia     Gestational diabetes 2001    Hypoglycemia 1994    Neuropathy     Polycystic ovary syndrome 2001    PTSD (post-traumatic stress disorder)     Vertigo     Vitamin D deficiency ???     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 The patient's chart has been reviewed for allergy information and updated as necessary.   Dilantin [phenytoin], Lisinopril, and Other    Allergies reviewed by Tamara Meadows RPH on 10/19/2023 at 10:37 AM    Relevant Laboratory Values  A1C Last 3 Results          8/22/2023    15:31   HGBA1C Last 3 Results   Hemoglobin A1C 9.9      Lab Results   Component Value Date    HGBA1C 9.9 08/22/2023     Lab Results   Component Value Date    GLUCOSE 94 01/30/2022    CALCIUM 8.8 (L) 02/08/2022     02/08/2022    K 4.6 02/08/2022    CO2 26 02/08/2022     02/08/2022    BUN 11 02/08/2022    CREATININE 0.80 02/08/2022    EGFRIFAFRI >60 02/08/2022    EGFRIFNONA >60 02/08/2022    BCR 14 02/08/2022    ANIONGAP 10 02/08/2022     No results found for: \"CHOL\", \"CHLPL\", \"TRIG\", \"HDL\", \"LDL\", \"LDLDIRECT\"      Current Medication List  This medication list has been reviewed with the patient and evaluated for any interactions or necessary modifications/recommendations, and updated to include all prescription medications, OTC medications, and supplements the patient is currently taking.  This list reflects what is contained in the patient's profile, which has also been marked as reviewed to communicate to other providers it is the most up to date version of the patient's current medication therapy.     Current Outpatient Medications:     aspirin 81 MG EC tablet, Take 1 tablet by mouth Daily., Disp: , Rfl:     budesonide-formoterol (SYMBICORT) 160-4.5 MCG/ACT inhaler, Inhale 2 puffs 2 (Two) Times a Day., Disp: , Rfl:     Cariprazine HCl (VRAYLAR) 1.5 MG capsule capsule, Take 1 capsule by mouth " Daily., Disp: , Rfl:     Continuous Blood Gluc  (Dexcom G6 ) device, 1 each Continuous., Disp: 1 each, Rfl: 0    Continuous Blood Gluc Sensor (Dexcom G6 Sensor), Every 10 (Ten) Days., Disp: 3 each, Rfl: 11    Continuous Blood Gluc Transmit (Dexcom G6 Transmitter) misc, 1 each Every 3 (Three) Months., Disp: 1 each, Rfl: 3    Dulaglutide (Trulicity) 4.5 MG/0.5ML solution pen-injector, Inject 0.5 mL under the skin into the appropriate area as directed 1 (One) Time Per Week., Disp: 6 mL, Rfl: 1    escitalopram (LEXAPRO) 10 MG tablet, Take 1 tablet by mouth Daily. (Patient taking differently: Take 2 tablets by mouth Daily.), Disp: 90 tablet, Rfl: 0    fenofibrate (TRICOR) 145 MG tablet, Take 1 tablet by mouth Daily., Disp: 90 tablet, Rfl: 2    fluticasone (FLONASE) 50 MCG/ACT nasal spray, 2 sprays into the nostril(s) as directed by provider Daily., Disp: , Rfl:     hydrOXYzine (ATARAX) 10 MG tablet, Daily As Needed., Disp: , Rfl:     Insulin Disposable Pump (Omnipod 5 G6 Pod, Gen 5,) misc, Use 1 pod Every Other Day., Disp: 15 each, Rfl: 5    Insulin Pen Needle 32G X 4 MM misc, Use to inject insulin as directed up to 4 times daily., Disp: 100 each, Rfl: 5    lamoTRIgine (LaMICtal) 25 MG tablet, Take 2 tablets by mouth 2 (two) times a day. (Patient taking differently: Take 4 tablets by mouth Daily.), Disp: 360 tablet, Rfl: 0    loratadine (CLARITIN) 10 MG tablet, Take 1 tablet by mouth Daily., Disp: , Rfl:     magnesium oxide (MAGOX) 400 (241.3 Mg) MG tablet tablet, Take 1 tablet by mouth Daily., Disp: , Rfl:     meclizine (ANTIVERT) 25 MG tablet, Take 1 tablet by mouth 2 (Two) Times a Day., Disp: , Rfl:     metFORMIN ER (GLUCOPHAGE-XR) 500 MG 24 hr tablet, Take 1 tablet by mouth 2 (Two) Times a Day., Disp: 180 tablet, Rfl: 1    montelukast (SINGULAIR) 10 MG tablet, Take 1 tablet by mouth., Disp: , Rfl:     Rivaroxaban (XARELTO) 2.5 MG tablet, Take 1 tablet by mouth 2 (Two) Times a Day., Disp: , Rfl:      rosuvastatin (CRESTOR) 20 MG tablet, Take 1 tablet by mouth Daily., Disp: 90 tablet, Rfl: 1    terbinafine (lamiSIL) 250 MG tablet, 1 tablet. Takes 1 tablet by mouth on the first 7 days of the month., Disp: , Rfl:     Insulin Aspart (NovoLOG) 100 UNIT/ML injection, For use in insulin pump. MDD 75, Disp: 70 mL, Rfl: 5    Insulin Glargine, 2 Unit Dial, (Toujeo Max SoloStar) 300 UNIT/ML solution pen-injector injection, Inject 60 Units under the skin at bedtime. (Patient not taking: Reported on 10/19/2023), Disp: 18 mL, Rfl: 1    Medicines reviewed by Tamara Meadows, [facility] on 10/19/2023 at  1:35 PM    Drug Interactions  The hypoglycemic effect of Insulin may be increased by Fenofibrate.     Adverse Drug Reactions  Adverse Reactions Experienced: None  Plan for ADR Management: Not required    Hospitalizations and Urgent Care Since Last Assessment  Hospitalizations or Admissions: None  ED Visits: None  Urgent Office Visits: None    Adherence and Self-Administration  Adherence related patient's specialty therapy was discussed with the patient. The Adherence segment of this outreach has been reviewed and updated.     Ongoing or New Barriers to Patient Adherence and/or Self-Administration: None  Methods for Supporting Patient Adherence and/or Self-Administration: None required     Goals of Therapy  Goals related to the patient's specialty therapy was discussed with the patient. The Patient Goals segment of this outreach has been reviewed and updated.    Goals Addressed Today        HEMOGLOBIN A1C < 7         Reassessment Plan & Follow-Up  Patient needs to switch to Novolog vials for use in insulin pump.   Medication Therapy Changes: Change to Novolog vials for use in insulin pump.   Additional Plans, Therapy Recommendations, or Therapy Problems to Be Addressed: [facility].   Pharmacist to perform regular reassessments no more than (6) months from the previous assessment.  Care  Coordinator to set up future refill outreaches, coordinate prescription delivery, and escalate clinical questions to pharmacist.     Attestation   I attest the patient was actively involved in and has agreed to the above plan of care. If the prescribed therapy is at any point deemed not appropriate based on the current or future assessments, a consultation will be initiated with the patient's specialty care provider to determine the best course of action. The revised plan of therapy will be documented along with any required assessments and/or additional patient education provided.     Tamara Meadows, PharmD, [facility]  Clinical Specialty Pharmacist, Endocrinology  10/19/2023  13:44 EDT

## 2023-12-04 RX ORDER — PROCHLORPERAZINE 25 MG/1
1 SUPPOSITORY RECTAL
Qty: 1 EACH | Refills: 3 | Status: SHIPPED | OUTPATIENT
Start: 2023-12-04

## 2023-12-04 RX ORDER — PROCHLORPERAZINE 25 MG/1
SUPPOSITORY RECTAL
Qty: 3 EACH | Refills: 11 | Status: SHIPPED | OUTPATIENT
Start: 2023-12-04

## 2023-12-15 ENCOUNTER — SPECIALTY PHARMACY (OUTPATIENT)
Dept: PHARMACY | Facility: HOSPITAL | Age: 53
End: 2023-12-15
Payer: MEDICARE

## 2023-12-15 ENCOUNTER — OFFICE VISIT (OUTPATIENT)
Dept: ENDOCRINOLOGY | Facility: CLINIC | Age: 53
End: 2023-12-15
Payer: MEDICARE

## 2023-12-15 VITALS
WEIGHT: 252.6 LBS | DIASTOLIC BLOOD PRESSURE: 50 MMHG | BODY MASS INDEX: 43.36 KG/M2 | SYSTOLIC BLOOD PRESSURE: 106 MMHG | OXYGEN SATURATION: 93 % | HEART RATE: 88 BPM

## 2023-12-15 DIAGNOSIS — Z79.4 TYPE 2 DIABETES MELLITUS WITH HYPERGLYCEMIA, WITH LONG-TERM CURRENT USE OF INSULIN: Primary | ICD-10-CM

## 2023-12-15 DIAGNOSIS — E11.65 TYPE 2 DIABETES MELLITUS WITH HYPERGLYCEMIA, WITH LONG-TERM CURRENT USE OF INSULIN: Primary | ICD-10-CM

## 2023-12-15 DIAGNOSIS — E11.65 TYPE 2 DIABETES MELLITUS WITH HYPERGLYCEMIA, WITH LONG-TERM CURRENT USE OF INSULIN: ICD-10-CM

## 2023-12-15 DIAGNOSIS — Z79.4 TYPE 2 DIABETES MELLITUS WITH HYPERGLYCEMIA, WITH LONG-TERM CURRENT USE OF INSULIN: ICD-10-CM

## 2023-12-15 LAB
EXPIRATION DATE: ABNORMAL
GLUCOSE BLDC GLUCOMTR-MCNC: 204 MG/DL (ref 70–130)
HBA1C MFR BLD: 7.1 % (ref 4.5–5.7)
Lab: ABNORMAL

## 2023-12-15 RX ORDER — ROSUVASTATIN CALCIUM 20 MG/1
20 TABLET, COATED ORAL DAILY
Qty: 90 TABLET | Refills: 1 | Status: SHIPPED | OUTPATIENT
Start: 2023-12-15

## 2023-12-15 RX ORDER — INSULIN PMP CART,AUT,G6/7,CNTR
1 EACH SUBCUTANEOUS EVERY OTHER DAY
Qty: 15 EACH | Refills: 5 | Status: SHIPPED | OUTPATIENT
Start: 2023-12-15

## 2023-12-15 RX ORDER — DULAGLUTIDE 4.5 MG/.5ML
4.5 INJECTION, SOLUTION SUBCUTANEOUS WEEKLY
Qty: 6 ML | Refills: 1 | Status: SHIPPED | OUTPATIENT
Start: 2023-12-15

## 2023-12-15 RX ORDER — METFORMIN HYDROCHLORIDE 500 MG/1
500 TABLET, EXTENDED RELEASE ORAL 2 TIMES DAILY
Qty: 180 TABLET | Refills: 1 | Status: SHIPPED | OUTPATIENT
Start: 2023-12-15

## 2023-12-15 RX ORDER — INSULIN ASPART 100 [IU]/ML
INJECTION, SOLUTION INTRAVENOUS; SUBCUTANEOUS
Qty: 90 ML | Refills: 1 | Status: SHIPPED | OUTPATIENT
Start: 2023-12-15

## 2023-12-15 NOTE — ASSESSMENT & PLAN NOTE
-Diabetes is improving with A1c down to 7.1  -Discussed importance of dietary and exercise guidelines.  -Continue Metformin 500 mg BID.  -Continue Trulicity 4.5 mg weekly.  -Continue using Dexcom CGM.   -2 week pump data download is as follows:  Very High: 25%  High: 37%  In Range: 38%  Low: 0%  Very Low: 0%  Trend shows overall hyperglycemia.  Basal: 53%  Bolus: 47%  Adjustments to insulin pump settings as below:  Basal: 1.3 u/hr  CR: 1:7.5  ISF: 28  Correction Threshold: 150  -Continue with improvements that have been made to diet and exercise regimen.  -Schedule a diabetic eye exam.  -Follow-up in 3 months when A1c due.

## 2023-12-15 NOTE — PROGRESS NOTES
Chief Complaint   Patient presents with    Diabetes        Michelle Estrada is a 53 y.o. female had concerns including Diabetes.    T2DM.    She is checking blood sugars with Dexcom CGM.  Current medications for diabetes include Trulicity 4.5 mg weekly-tolerating this well, Novolog in an Omnipod insulin pump, Metformin 500 mg BID.  She has been doing well since her last visit.  She has been noting improving BG's but is still have most over goal.  Hypos: rarely  Last optho exam: 2022-Dr. Horton    Most recent A1C:  5.7 (5/11/2022)    The following portions of the patient's history were reviewed and updated as appropriate: allergies, current medications, past family history, past medical history, past social history, past surgical history and problem list.      Review of Systems   Constitutional:  Negative for fatigue.   Gastrointestinal:  Positive for nausea and vomiting.   Endocrine: Positive for polydipsia and polyphagia. Negative for polyuria.   Psychiatric/Behavioral:  Positive for sleep disturbance.    All other systems reviewed and are negative.     Physical Exam  Vitals reviewed.   Constitutional:       Appearance: Normal appearance.   Cardiovascular:      Rate and Rhythm: Normal rate.   Pulmonary:      Effort: Pulmonary effort is normal.   Neurological:      Mental Status: She is alert and oriented to person, place, and time.   Psychiatric:         Mood and Affect: Mood normal.         Behavior: Behavior normal.         Thought Content: Thought content normal.         Judgment: Judgment normal.        /50 (BP Location: Right arm, Patient Position: Sitting, Cuff Size: Adult)   Pulse 88   Wt 115 kg (252 lb 9.6 oz)   SpO2 93%   BMI 43.36 kg/m²      Labs and imaging    CMP:  Lab Results   Component Value Date     (H) 02/08/2022    BUN 11 02/08/2022    CREATININE 0.80 02/08/2022    EGFRIFNONA >60 02/08/2022    EGFRIFAFRI >60 02/08/2022    BCR 14 02/08/2022     02/08/2022    K 4.6 02/08/2022     "CO2 26 02/08/2022    CALCIUM 8.8 (L) 02/08/2022    ALBUMIN 3.5 02/08/2022    BILITOT 0.3 02/08/2022    ALKPHOS 88 02/08/2022    AST 21 02/08/2022    ALT 22 02/08/2022     Lipid Panel:  No results found for: \"CHOL\", \"TRIG\", \"HDL\", \"VLDL\", \"LDL\"    HbA1c:  Lab Results   Component Value Date    HGBA1C 7.1 (A) 12/15/2023    HGBA1C 9.9 08/22/2023     Glucose:      Lab Results   Component Value Date    POCGLU 204 (A) 12/15/2023     Microalbumin:  No results found for: \"MALBCRERATIO\"  TSH:  No results found for: \"TSH\"    Assessment and plan  Diagnoses and all orders for this visit:    1. Type 2 diabetes mellitus with hyperglycemia, with long-term current use of insulin (Primary)  Assessment & Plan:  -Diabetes is improving with A1c down to 7.1  -Discussed importance of dietary and exercise guidelines.  -Continue Metformin 500 mg BID.  -Continue Trulicity 4.5 mg weekly.  -Continue using Dexcom CGM.   -2 week pump data download is as follows:  Very High: 25%  High: 37%  In Range: 38%  Low: 0%  Very Low: 0%  Trend shows overall hyperglycemia.  Basal: 53%  Bolus: 47%  Adjustments to insulin pump settings as below:  Basal: 1.3 u/hr  CR: 1:7.5  ISF: 28  Correction Threshold: 150  -Continue with improvements that have been made to diet and exercise regimen.  -Schedule a diabetic eye exam.  -Follow-up in 3 months when A1c due.    Orders:  -     POC Glucose Fingerstick  -     POC Glycosylated Hemoglobin (Hb A1C)         Return in about 3 months (around 3/15/2024) for Follow-up appointment, A1C. The patient was instructed to contact the clinic with any interval questions or concerns.    This document has been electronically signed by JIN Jaar  December 15, 2023 09:29 EST  Endocrinology  "

## 2023-12-15 NOTE — PROGRESS NOTES
"   Specialty Pharmacy Patient Management Program  Endocrinology Reassessment     Michelle Estrada is a 53 y.o. female seen by an Endocrinology Provider for Type 2 Diabetes and enrolled in the Endocrinology Patient Management program offered by [facility].  A follow-up outreach was conducted, including assessment of continued therapy appropriateness, medication adherence, and side effect incidence and management for metformin, Trulicity, and insulin pump therapy.     Patient is currently using Novolog in OmniPod 5 insulin pump, Trulicity 4.5 mg weekly, and Metformin  mg twice daily. She is doing well with current regimen and denies side effects. She uses Dexcom G6 to monitor BG with average in the 180's and denies low BG <70.    Changes to Insurance Coverage or Financial Support  None      Relevant Past Medical History and Comorbidities  Relevant medical history and concomitant health conditions were discussed with the patient. The patient's chart has been reviewed for relevant past medical history and comorbid health conditions and updated as necessary.   Past Medical History:   Diagnosis Date    Anxiety     Bipolar 1 disorder     COPD (chronic obstructive pulmonary disease)     Depression     Diabetes mellitus     Fibromyalgia     Gestational diabetes 2001    Hypoglycemia 1994    Neuropathy     Polycystic ovary syndrome 2001    PTSD (post-traumatic stress disorder)     Vertigo     Vitamin D deficiency ???     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 The patient's chart has been reviewed for allergy information and updated as necessary.   Allergies   Allergen Reactions    Dilantin [Phenytoin] Hives    Lisinopril Cough    Other Other (See Comments)     ADVICOR - pt stated she was \"boiling from the inside out\"       Allergies reviewed by Tamara Meadows RPH on 12/15/2023 at  8:42 AM    Relevant Laboratory Values  A1C Last 3 Results          8/22/2023    15:31 12/15/2023    08:43   HGBA1C Last 3 Results   Hemoglobin A1C 9.9  7.1      Lab Results   Component Value Date    HGBA1C 7.1 (A) 12/15/2023     Lab Results   Component Value Date    GLUCOSE 94 01/30/2022    CALCIUM 8.8 (L) 02/08/2022     02/08/2022    K 4.6 02/08/2022    CO2 26 02/08/2022     02/08/2022    BUN 11 02/08/2022    CREATININE 0.80 02/08/2022    EGFRIFAFRI >60 02/08/2022    EGFRIFNONA >60 02/08/2022    BCR 14 02/08/2022    ANIONGAP 10 02/08/2022     No results found for: \"CHOL\", \"CHLPL\", \"TRIG\", \"HDL\", \"LDL\", \"LDLDIRECT\"      Current Medication List  This medication list has been reviewed with the patient and evaluated for any interactions or necessary modifications/recommendations, and updated to include all prescription medications, OTC medications, and supplements the patient is currently taking. This list reflects what is contained in the patient's profile, which has also been marked as reviewed to communicate to other providers it is the most up to date version of the patient's current medication therapy.     Current Outpatient " Medications:     aspirin 81 MG EC tablet, Take 1 tablet by mouth Daily., Disp: , Rfl:     budesonide-formoterol (SYMBICORT) 160-4.5 MCG/ACT inhaler, Inhale 2 puffs 2 (Two) Times a Day., Disp: , Rfl:     Cariprazine HCl (VRAYLAR) 1.5 MG capsule capsule, Take 1 capsule by mouth Daily., Disp: , Rfl:     Continuous Blood Gluc  (Dexcom G6 ) device, 1 each Continuous., Disp: 1 each, Rfl: 0    Continuous Blood Gluc Sensor (Dexcom G6 Sensor), Use Every 10 (Ten) Days., Disp: 3 each, Rfl: 11    Continuous Blood Gluc Transmit (Dexcom G6 Transmitter) misc, Use 1 each Every 3 (Three) Months., Disp: 1 each, Rfl: 3    Dulaglutide (Trulicity) 4.5 MG/0.5ML solution pen-injector, Inject 0.5 mL under the skin into the appropriate area as directed 1 (One) Time Per Week., Disp: 6 mL, Rfl: 1    escitalopram (LEXAPRO) 10 MG tablet, Take 1 tablet by mouth Daily. (Patient taking differently: Take 2 tablets by mouth Daily.), Disp: 90 tablet, Rfl: 0    fenofibrate (TRICOR) 145 MG tablet, Take 1 tablet by mouth Daily., Disp: 90 tablet, Rfl: 2    fluticasone (FLONASE) 50 MCG/ACT nasal spray, 2 sprays into the nostril(s) as directed by provider Daily., Disp: , Rfl:     hydrOXYzine (ATARAX) 10 MG tablet, Daily As Needed., Disp: , Rfl:     Insulin Aspart (NovoLOG) 100 UNIT/ML injection, For use in insulin pump. Maximum Daily Dose: 100 units., Disp: 90 mL, Rfl: 1    Insulin Disposable Pump (Omnipod 5 G6 Pod, Gen 5,) misc, Use 1 pod Every Other Day., Disp: 15 each, Rfl: 5    Insulin Pen Needle 32G X 4 MM misc, Use to inject insulin as directed up to 4 times daily., Disp: 100 each, Rfl: 5    lamoTRIgine (LaMICtal) 25 MG tablet, Take 2 tablets by mouth 2 (two) times a day. (Patient taking differently: Take 4 tablets by mouth Daily.), Disp: 360 tablet, Rfl: 0    meclizine (ANTIVERT) 25 MG tablet, Take 1 tablet by mouth 2 (Two) Times a Day., Disp: , Rfl:     metFORMIN ER (GLUCOPHAGE-XR) 500 MG 24 hr tablet, Take 1 tablet by mouth 2 (Two)  Times a Day., Disp: 180 tablet, Rfl: 1    montelukast (SINGULAIR) 10 MG tablet, Take 1 tablet by mouth., Disp: , Rfl:     Rivaroxaban (XARELTO) 2.5 MG tablet, Take 1 tablet by mouth 2 (Two) Times a Day., Disp: , Rfl:     rosuvastatin (CRESTOR) 20 MG tablet, Take 1 tablet by mouth Daily., Disp: 90 tablet, Rfl: 1    terbinafine (lamiSIL) 250 MG tablet, 1 tablet. Takes 1 tablet by mouth on the first 7 days of the month., Disp: , Rfl:     Insulin Glargine, 2 Unit Dial, (Toujeo Max SoloStar) 300 UNIT/ML solution pen-injector injection, Inject 60 Units under the skin at bedtime. (Patient not taking: Reported on 10/19/2023), Disp: 18 mL, Rfl: 1    magnesium oxide (MAGOX) 400 (241.3 Mg) MG tablet tablet, Take 1 tablet by mouth Daily. (Patient not taking: Reported on 12/15/2023), Disp: , Rfl:     Medicines reviewed by Tamara Meadows [facility] on 12/15/2023 at  8:45 AM    Drug Interactions  No significant drug-drug interactions with diabetes medications expected according to literature.  The hypoglycemic effect of Insulin may be increased by fenofibrate     Recommended Medications Assessment  Aspirin: Currently Taking   Statin: Currently Taking   ACEi/ARB: Not Taking Currently    Adverse Drug Reactions  Adverse Reactions Experienced: None    Medication tolerability: Tolerating with no to minimal ADRs  Medication plan: Continue therapy with normal follow-up  Plan for ADR Management: None required     Adherence, Self-Administration, and Current Therapy Problems  Adherence related to the patient's specialty therapy was discussed with the patient. The Adherence segment of this outreach has been reviewed and updated.   Adherence Questions  Linked Medication(s) Assessed: Continuous Blood Gluc Sensor, Dulaglutide, Insulin Aspart, Insulin Disposable Pump  On average, how many doses/injections does the patient miss per month?: 1  What are the identified reasons for non-adherence or missed doses? : no problems identified  What is the  estimated medication adherence level?: %  Based on the patient/caregiver response and refill history, does this patient require an MTP to track adherence improvements?: no    Additional Barriers to Patient Self-Administration: None  Methods for Supporting Patient Self-Administration: None required    Recently Close Medication Therapy Problems  No medication therapy recommendations to display    Goals of Therapy  Goals related to the patient's specialty therapy was discussed with the patient. The Patient Goals segment of this outreach has been reviewed and updated.    Goals Addressed Today        Consistently take medications as prescribed      Specialty Pharmacy General Goal      Prevent hypoglycemia             Quality of Life Assessment   Quality of Life related to the patient's enrollment in the patient management program and services provided was discussed with the patient. The QOL segment of this outreach has been reviewed and updated.   Quality of Life Improvement Scale: 9-A good deal better    Reassessment Plan & Follow-Up  Patient's diabetes has improved with A1C down to 9.9% from 7.1%.  Medication Therapy Changes: None today. Continue current regimen.    Related Plans, Therapy Recommendations, or Issues to Be Addressed: Will send updated RX's to [facility] for shipping services.  Pharmacist to perform regular reassessments no more than (6) months from the previous assessment.  Care Coordinator to set up future refill outreaches, coordinate prescription delivery, and escalate clinical questions to pharmacist.     Attestation  I attest the patient was actively involved in and has agreed to the above plan of care. If the prescribed therapy is at any point deemed not appropriate based on the current or future assessments, a consultation will be initiated with the patient's specialty care provider to determine the best course of action. The revised plan of therapy will be documented along with any required  assessments and/or additional patient education provided.    Tamara Meadows, PharmD, KANA SALAZAR  Clinical Specialty Pharmacist, Endocrinology  12/15/2023  09:33 EST

## 2023-12-27 ENCOUNTER — SPECIALTY PHARMACY (OUTPATIENT)
Dept: PHARMACY | Facility: HOSPITAL | Age: 53
End: 2023-12-27
Payer: MEDICARE

## 2024-01-09 ENCOUNTER — SPECIALTY PHARMACY (OUTPATIENT)
Dept: PHARMACY | Facility: HOSPITAL | Age: 54
End: 2024-01-09
Payer: MEDICARE

## 2024-01-30 ENCOUNTER — SPECIALTY PHARMACY (OUTPATIENT)
Dept: PHARMACY | Facility: HOSPITAL | Age: 54
End: 2024-01-30
Payer: MEDICARE

## 2024-03-08 ENCOUNTER — SPECIALTY PHARMACY (OUTPATIENT)
Dept: PHARMACY | Facility: HOSPITAL | Age: 54
End: 2024-03-08
Payer: MEDICARE

## 2024-03-19 ENCOUNTER — SPECIALTY PHARMACY (OUTPATIENT)
Dept: ENDOCRINOLOGY | Facility: CLINIC | Age: 54
End: 2024-03-19
Payer: MEDICARE

## 2024-03-19 ENCOUNTER — OFFICE VISIT (OUTPATIENT)
Dept: ENDOCRINOLOGY | Facility: CLINIC | Age: 54
End: 2024-03-19
Payer: MEDICARE

## 2024-03-19 VITALS
OXYGEN SATURATION: 97 % | HEART RATE: 84 BPM | HEIGHT: 64 IN | BODY MASS INDEX: 41.59 KG/M2 | WEIGHT: 243.6 LBS | DIASTOLIC BLOOD PRESSURE: 82 MMHG | SYSTOLIC BLOOD PRESSURE: 149 MMHG

## 2024-03-19 DIAGNOSIS — Z79.4 TYPE 2 DIABETES MELLITUS WITH HYPERGLYCEMIA, WITH LONG-TERM CURRENT USE OF INSULIN: Primary | ICD-10-CM

## 2024-03-19 DIAGNOSIS — E11.65 TYPE 2 DIABETES MELLITUS WITH HYPERGLYCEMIA, WITH LONG-TERM CURRENT USE OF INSULIN: Primary | ICD-10-CM

## 2024-03-19 DIAGNOSIS — Z79.4 TYPE 2 DIABETES MELLITUS WITH HYPERGLYCEMIA, WITH LONG-TERM CURRENT USE OF INSULIN: ICD-10-CM

## 2024-03-19 DIAGNOSIS — E11.65 TYPE 2 DIABETES MELLITUS WITH HYPERGLYCEMIA, WITH LONG-TERM CURRENT USE OF INSULIN: ICD-10-CM

## 2024-03-19 LAB
EXPIRATION DATE: ABNORMAL
HBA1C MFR BLD: 8 % (ref 4.5–5.7)
Lab: ABNORMAL

## 2024-03-19 PROCEDURE — 3052F HG A1C>EQUAL 8.0%<EQUAL 9.0%: CPT | Performed by: NURSE PRACTITIONER

## 2024-03-19 PROCEDURE — 99214 OFFICE O/P EST MOD 30 MIN: CPT | Performed by: NURSE PRACTITIONER

## 2024-03-19 PROCEDURE — 83036 HEMOGLOBIN GLYCOSYLATED A1C: CPT | Performed by: NURSE PRACTITIONER

## 2024-03-19 PROCEDURE — 1160F RVW MEDS BY RX/DR IN RCRD: CPT | Performed by: NURSE PRACTITIONER

## 2024-03-19 PROCEDURE — 95251 CONT GLUC MNTR ANALYSIS I&R: CPT | Performed by: NURSE PRACTITIONER

## 2024-03-19 PROCEDURE — 1159F MED LIST DOCD IN RCRD: CPT | Performed by: NURSE PRACTITIONER

## 2024-03-19 RX ORDER — DULAGLUTIDE 4.5 MG/.5ML
4.5 INJECTION, SOLUTION SUBCUTANEOUS WEEKLY
Qty: 6 ML | Refills: 1 | Status: SHIPPED | OUTPATIENT
Start: 2024-03-19

## 2024-03-19 RX ORDER — INSULIN PMP CART,AUT,G6/7,CNTR
1 EACH SUBCUTANEOUS EVERY OTHER DAY
Qty: 45 EACH | Refills: 1 | Status: SHIPPED | OUTPATIENT
Start: 2024-03-19

## 2024-03-19 RX ORDER — METFORMIN HYDROCHLORIDE 500 MG/1
500 TABLET, EXTENDED RELEASE ORAL 2 TIMES DAILY
Qty: 180 TABLET | Refills: 1 | Status: SHIPPED | OUTPATIENT
Start: 2024-03-19

## 2024-03-19 RX ORDER — ERGOCALCIFEROL 1.25 MG/1
50000 CAPSULE ORAL WEEKLY
COMMUNITY

## 2024-03-19 RX ORDER — INSULIN ASPART 100 [IU]/ML
INJECTION, SOLUTION INTRAVENOUS; SUBCUTANEOUS
Qty: 90 ML | Refills: 1 | Status: SHIPPED | OUTPATIENT
Start: 2024-03-19

## 2024-03-19 NOTE — PROGRESS NOTES
Specialty Pharmacy Patient Management Program  Endocrinology Reassessment     Michelle Estrada is a 53 y.o. female seen by an Endocrinology Provider for Type 2 Diabetes and enrolled in the Endocrinology Patient Management program offered by [facility].  A follow-up outreach was conducted, including assessment of continued therapy appropriateness, medication adherence, and side effect incidence and management for metformin, Trulicity, and insulin pump therapy.     Patient is currently using Novolog in OmniPod 5 insulin pump, Trulicity 4.5 mg weekly, and Metformin  mg twice daily. She is doing well with current regimen and denies side effects. She uses Dexcom G6 to monitor BG with higher than normal BG's as she has just completed a course of oral steroids for her asthma.  She denies low BG <70.    Changes to Insurance Coverage or Financial Support  None      Relevant Past Medical History and Comorbidities  Relevant medical history and concomitant health conditions were discussed with the patient. The patient's chart has been reviewed for relevant past medical history and comorbid health conditions and updated as necessary.   Past Medical History:   Diagnosis Date    Anxiety     Bipolar 1 disorder     COPD (chronic obstructive pulmonary disease)     Depression     Diabetes mellitus     Fibromyalgia     Gestational diabetes 2001    Hypoglycemia 1994    Neuropathy     Polycystic ovary syndrome 2001    PTSD (post-traumatic stress disorder)     Vertigo     Vitamin D deficiency ???     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 The patient's chart has been reviewed for allergy information and updated as necessary.   Allergies   Allergen Reactions    Dilantin [Phenytoin] Hives    Lisinopril Cough    Other Other (See Comments)     ADVICOR - pt stated she was \"boiling from the inside out\"       Allergies reviewed by Tamara Meadows RPH on 3/19/2024 at  9:51 AM    Relevant Laboratory Values  A1C Last 3 Results          8/22/2023    15:31 12/15/2023    08:43 3/19/2024    09:56   HGBA1C Last 3 Results   Hemoglobin A1C 9.9  7.1  8.0      Lab Results   Component Value Date    HGBA1C 8.0 (A) 03/19/2024     Lab Results   Component Value Date    GLUCOSE 94 01/30/2022    CALCIUM 8.8 (L) 02/08/2022     02/08/2022    K 4.6 02/08/2022    CO2 26 02/08/2022     02/08/2022    BUN 11 02/08/2022    CREATININE 0.80 02/08/2022    EGFRIFAFRI >60 02/08/2022    EGFRIFNONA >60 02/08/2022    BCR 14 02/08/2022    ANIONGAP 10 02/08/2022     No results found for: \"CHOL\", \"CHLPL\", \"TRIG\", \"HDL\", \"LDL\", \"LDLDIRECT\"      Current Medication List  This medication list has been reviewed with the patient and evaluated for any interactions or necessary modifications/recommendations, and updated to include all prescription medications, OTC medications, and supplements the patient is currently taking. This list reflects what is contained in the patient's profile, which has also been marked as reviewed to communicate to other providers it is the " most up to date version of the patient's current medication therapy.     Current Outpatient Medications:     aspirin 81 MG EC tablet, Take 1 tablet by mouth Daily., Disp: , Rfl:     budesonide-formoterol (SYMBICORT) 160-4.5 MCG/ACT inhaler, Inhale 2 puffs 2 (Two) Times a Day., Disp: , Rfl:     Cariprazine HCl (VRAYLAR) 1.5 MG capsule capsule, Take 1 capsule by mouth Daily., Disp: , Rfl:     Continuous Blood Gluc  (Dexcom G6 ) device, 1 each Continuous., Disp: 1 each, Rfl: 0    Continuous Blood Gluc Sensor (Dexcom G6 Sensor), Use Every 10 (Ten) Days., Disp: 3 each, Rfl: 11    Continuous Blood Gluc Transmit (Dexcom G6 Transmitter) misc, Use 1 each Every 3 (Three) Months., Disp: 1 each, Rfl: 3    Dulaglutide (Trulicity) 4.5 MG/0.5ML solution pen-injector, Inject 0.5 mL under the skin into the appropriate area as directed 1 (One) Time Per Week., Disp: 6 mL, Rfl: 1    escitalopram (LEXAPRO) 10 MG tablet, Take 1 tablet by mouth Daily. (Patient taking differently: Take 2 tablets by mouth Daily.), Disp: 90 tablet, Rfl: 0    fenofibrate (TRICOR) 145 MG tablet, Take 1 tablet by mouth Daily., Disp: 90 tablet, Rfl: 2    fluticasone (FLONASE) 50 MCG/ACT nasal spray, 2 sprays into the nostril(s) as directed by provider Daily., Disp: , Rfl:     hydrOXYzine (ATARAX) 10 MG tablet, Daily As Needed., Disp: , Rfl:     Insulin Aspart (NovoLOG) 100 UNIT/ML injection, For use in insulin pump. Maximum Daily Dose: 100 units., Disp: 90 mL, Rfl: 1    Insulin Disposable Pump (Omnipod 5 G6 Pods, Gen 5,) misc, Use 1 pod Every Other Day., Disp: 15 each, Rfl: 5    Insulin Pen Needle 32G X 4 MM misc, Use to inject insulin as directed up to 4 times daily., Disp: 100 each, Rfl: 5    lamoTRIgine (LaMICtal) 25 MG tablet, Take 2 tablets by mouth 2 (two) times a day. (Patient taking differently: Take 4 tablets by mouth Daily.), Disp: 360 tablet, Rfl: 0    magnesium oxide (MAGOX) 400 (241.3 Mg) MG tablet tablet, Take 1 tablet by mouth  Daily., Disp: , Rfl:     meclizine (ANTIVERT) 25 MG tablet, Take 1 tablet by mouth 2 (Two) Times a Day., Disp: , Rfl:     metFORMIN ER (GLUCOPHAGE-XR) 500 MG 24 hr tablet, Take 1 tablet by mouth 2 (Two) Times a Day., Disp: 180 tablet, Rfl: 1    montelukast (SINGULAIR) 10 MG tablet, Take 1 tablet by mouth., Disp: , Rfl:     rosuvastatin (CRESTOR) 20 MG tablet, Take 1 tablet by mouth Daily., Disp: 90 tablet, Rfl: 1    terbinafine (lamiSIL) 250 MG tablet, 1 tablet. Takes 1 tablet by mouth on the first 7 days of the month., Disp: , Rfl:     vitamin D (ERGOCALCIFEROL) 1.25 MG (74529 UT) capsule capsule, Take 1 capsule by mouth 1 (One) Time Per Week., Disp: , Rfl:     Insulin Glargine, 2 Unit Dial, (Toujeo Max SoloStar) 300 UNIT/ML solution pen-injector injection, Inject 60 Units under the skin at bedtime. (Patient not taking: Reported on 10/19/2023), Disp: 18 mL, Rfl: 1    Medicines reviewed by Tamara Meadows, [facility] on 3/19/2024 at  9:54 AM    Drug Interactions  No significant drug-drug interactions with diabetes medications expected according to literature.  The hypoglycemic effect of Insulin may be increased by fenofibrate     Recommended Medications Assessment  Aspirin: Currently Taking   Statin: Currently Taking   ACEi/ARB: Not Taking Currently    Adverse Drug Reactions  Adverse Reactions Experienced: None    Medication tolerability: Tolerating with no to minimal ADRs  Medication plan: Continue therapy with normal follow-up  Plan for ADR Management: None required     Adherence, Self-Administration, and Current Therapy Problems  Adherence related to the patient's specialty therapy was discussed with the patient. The Adherence segment of this outreach has been reviewed and updated.   Adherence Questions  Linked Medication(s) Assessed: Continuous Blood Gluc Sensor, Dulaglutide, Insulin Aspart, Insulin Disposable Pump  On average, how many doses/injections does the patient miss per month?: 0  What are the identified reasons  for non-adherence or missed doses? : no problems identified  What is the estimated medication adherence level?: %  Based on the patient/caregiver response and refill history, does this patient require an MTP to track adherence improvements?: no    Additional Barriers to Patient Self-Administration: None  Methods for Supporting Patient Self-Administration: None required    Recently Close Medication Therapy Problems  No medication therapy recommendations to display    Goals of Therapy  Goals related to the patient's specialty therapy was discussed with the patient. The Patient Goals segment of this outreach has been reviewed and updated.    Goals Addressed Today        Consistently take medications as prescribed      Specialty Pharmacy General Goal      Prevent hypoglycemia             Quality of Life Assessment   Quality of Life related to the patient's enrollment in the patient management program and services provided was discussed with the patient. The QOL segment of this outreach has been reviewed and updated.   Quality of Life Improvement Scale: 9-A good deal better    Reassessment Plan & Follow-Up  Patient's diabetes has worsened with A1C up to 8% from 7.1%.  Medication Therapy Changes: None today. Provider to adjust insulin pump settings to compensate for elevated BG's.  Related Plans, Therapy Recommendations, or Issues to Be Addressed: [facility]   Pharmacist to perform regular reassessments no more than (6) months from the previous assessment.  Care Coordinator to set up future refill outreaches, coordinate prescription delivery, and escalate clinical questions to pharmacist.     Attestation  I attest the patient was actively involved in and has agreed to the above plan of care. If the prescribed therapy is at any point deemed not appropriate based on the current or future assessments, a consultation will be initiated with the  patient's specialty care provider to determine the best course of action. The revised plan of therapy will be documented along with any required assessments and/or additional patient education provided.    Tamara Meadows, PharmD, MARIE, KANA  Clinical Specialty Pharmacist, Endocrinology  3/19/2024  15:14 EDT

## 2024-03-19 NOTE — PROGRESS NOTES
"Chief Complaint   Patient presents with    Diabetes        Michelle Estrada is a 53 y.o. female had concerns including Diabetes.    T2DM.    She has been on steroids for the last 2 weeks.  During this time she has noted increased BG's.    History:  She is checking blood sugars with Dexcom CGM.  Current medications for diabetes include Trulicity 4.5 mg weekly-tolerating this well, Novolog in an Omnipod insulin pump, Metformin 500 mg BID.  She has been doing well since her last visit.  She has been noting improving BG's but is still have most over goal.  Hypos: rarely  Last optho exam: 2022-Dr. Horton    Most recent A1C:  5.7 (5/11/2022)    The following portions of the patient's history were reviewed and updated as appropriate: allergies, current medications, past family history, past medical history, past social history, past surgical history and problem list.      Review of Systems   Constitutional:  Negative for fatigue.   Gastrointestinal:  Positive for nausea and vomiting.   Endocrine: Positive for polydipsia and polyphagia. Negative for polyuria.   Psychiatric/Behavioral:  Positive for sleep disturbance.    All other systems reviewed and are negative.     Physical Exam  Vitals reviewed.   Constitutional:       Appearance: Normal appearance.   Cardiovascular:      Rate and Rhythm: Normal rate.   Pulmonary:      Effort: Pulmonary effort is normal.   Neurological:      Mental Status: She is alert and oriented to person, place, and time.   Psychiatric:         Mood and Affect: Mood normal.         Behavior: Behavior normal.         Thought Content: Thought content normal.         Judgment: Judgment normal.        /82 (BP Location: Right arm, Patient Position: Sitting, Cuff Size: Adult)   Pulse 84   Ht 162.6 cm (64\")   Wt 110 kg (243 lb 9.6 oz)   SpO2 97%   BMI 41.81 kg/m²      Labs and imaging    CMP:  Lab Results   Component Value Date     (H) 02/08/2022    BUN 11 02/08/2022    CREATININE 0.80 " "02/08/2022    EGFRIFNONA >60 02/08/2022    EGFRIFAFRI >60 02/08/2022    BCR 14 02/08/2022     02/08/2022    K 4.6 02/08/2022    CO2 26 02/08/2022    CALCIUM 8.8 (L) 02/08/2022    ALBUMIN 3.5 02/08/2022    BILITOT 0.3 02/08/2022    ALKPHOS 88 02/08/2022    AST 21 02/08/2022    ALT 22 02/08/2022     Lipid Panel:  No results found for: \"CHOL\", \"TRIG\", \"HDL\", \"VLDL\", \"LDL\"    HbA1c:  Lab Results   Component Value Date    HGBA1C 8.0 (A) 03/19/2024    HGBA1C 7.1 (A) 12/15/2023     Glucose:      Lab Results   Component Value Date    POCGLU 204 (A) 12/15/2023     Microalbumin:  No results found for: \"MALBCRERATIO\"  TSH:  No results found for: \"TSH\"    Assessment and plan  Diagnoses and all orders for this visit:    1. Type 2 diabetes mellitus with hyperglycemia, with long-term current use of insulin (Primary)  Assessment & Plan:  -Diabetes is worsening with A1c up from 7.1 to 8.0.  -Discussed importance of dietary and exercise guidelines.  -Continue Metformin 500 mg BID.  -Continue Trulicity 4.5 mg weekly.  -Continue using Dexcom CGM.   -2 week pump data download is as follows:  Very High: 13%  High: 42%  In Range: 45%  Low: 0%  Very Low: 0%  Trend shows overall hyperglycemia.  Basal: 67%  Bolus: 33%  Adjustments to insulin pump settings as below:  Basal: 1.3 u/hr  CR: 1:7.5  ISF: 28  Correction Threshold: 150  -Continue with improvements that have been made to diet and exercise regimen.  -Schedule a diabetic eye exam.  -Follow-up in 3 months when A1c due.    Orders:  -     POC Glycosylated Hemoglobin (Hb A1C)         Return in about 3 months (around 6/19/2024) for A1C, Follow-up appointment. The patient was instructed to contact the clinic with any interval questions or concerns.    This document has been electronically signed by JIN Jara  April 1, 2024 14:27 EDT  Endocrinology  "

## 2024-03-25 ENCOUNTER — TELEPHONE (OUTPATIENT)
Dept: ENDOCRINOLOGY | Facility: CLINIC | Age: 54
End: 2024-03-25

## 2024-03-25 NOTE — TELEPHONE ENCOUNTER
[facility] received a fax that requires your attention. The document has been indexed to the patient’s chart for your review.      Reason for sending: PHYSICIAN SIGNATURE NEEDED    Documents Description: SIGNATURE OF ORDERING PHYSICAN    Name of Sender: [facility] MEDICAL DOCUMENTATION     Date Indexed: 03-    Notes (if needed): INDEXING THE FORM UNDER EXTERNAL MED RECORDS.

## 2024-04-01 NOTE — ASSESSMENT & PLAN NOTE
-Diabetes is worsening with A1c up from 7.1 to 8.0.  -Discussed importance of dietary and exercise guidelines.  -Continue Metformin 500 mg BID.  -Continue Trulicity 4.5 mg weekly.  -Continue using Dexcom CGM.   -2 week pump data download is as follows:  Very High: 13%  High: 42%  In Range: 45%  Low: 0%  Very Low: 0%  Trend shows overall hyperglycemia.  Basal: 67%  Bolus: 33%  Adjustments to insulin pump settings as below:  Basal: 1.3 u/hr  CR: 1:7.5  ISF: 28  Correction Threshold: 150  -Continue with improvements that have been made to diet and exercise regimen.  -Schedule a diabetic eye exam.  -Follow-up in 3 months when A1c due.

## 2024-06-03 ENCOUNTER — SPECIALTY PHARMACY (OUTPATIENT)
Dept: PHARMACY | Facility: HOSPITAL | Age: 54
End: 2024-06-03
Payer: MEDICARE

## 2024-06-03 NOTE — PROGRESS NOTES
Specialty Pharmacy Refill Coordination Note     Michelle is a 53 y.o. female contacted today regarding refills of  Metformin and Omnipod specialty medication(s).    Reviewed and verified with patient:       Specialty medication(s) and dose(s) confirmed: yes    Refill Questions      Flowsheet Row Most Recent Value   Changes to allergies? No   Changes to medications? No   New conditions or infections since last clinic visit No   Unplanned office visit, urgent care, ED, or hospital admission in the last 4 weeks  No   How does patient/caregiver feel medication is working? Very good   Financial problems or insurance changes  No   Since the previous refill, were any specialty medication doses or scheduled injections missed or delayed?  No   Why were doses missed? na   Does this patient require a clinical escalation to a pharmacist? No            Delivery Questions      Flowsheet Row Most Recent Value   Copay verified? No   Copay amount na   Copay form of payment No copayment ($0)                   Follow-up: 30 day(s)     Ginger Clayton Pharmacy Technician  Specialty Pharmacy Technician

## 2024-06-17 ENCOUNTER — SPECIALTY PHARMACY (OUTPATIENT)
Dept: PHARMACY | Facility: HOSPITAL | Age: 54
End: 2024-06-17
Payer: MEDICARE

## 2024-06-17 NOTE — PROGRESS NOTES
Specialty Pharmacy Refill Coordination Note     Michelle is a 53 y.o. female contacted today regarding refills of  Trulicity specialty medication(s).    Reviewed and verified with patient:       Specialty medication(s) and dose(s) confirmed: yes    Refill Questions      Flowsheet Row Most Recent Value   Changes to allergies? No   Changes to medications? No   New conditions or infections since last clinic visit No   Unplanned office visit, urgent care, ED, or hospital admission in the last 4 weeks  No   How does patient/caregiver feel medication is working? Very good   Financial problems or insurance changes  No   Since the previous refill, were any specialty medication doses or scheduled injections missed or delayed?  No   Why were doses missed? na   Does this patient require a clinical escalation to a pharmacist? No            Delivery Questions      Flowsheet Row Most Recent Value   Copay verified? No   Copay amount na   Copay form of payment No copayment ($0)                   Follow-up: 90 day(s)     Ginger Clyaton Pharmacy Technician  Specialty Pharmacy Technician

## 2024-08-07 DIAGNOSIS — E11.65 TYPE 2 DIABETES MELLITUS WITH HYPERGLYCEMIA, WITH LONG-TERM CURRENT USE OF INSULIN: Primary | ICD-10-CM

## 2024-08-07 DIAGNOSIS — Z79.4 TYPE 2 DIABETES MELLITUS WITH HYPERGLYCEMIA, WITH LONG-TERM CURRENT USE OF INSULIN: Primary | ICD-10-CM

## 2024-08-27 ENCOUNTER — SPECIALTY PHARMACY (OUTPATIENT)
Dept: PHARMACY | Facility: HOSPITAL | Age: 54
End: 2024-08-27

## 2024-08-29 ENCOUNTER — SPECIALTY PHARMACY (OUTPATIENT)
Dept: PHARMACY | Facility: HOSPITAL | Age: 54
End: 2024-08-29
Payer: MEDICARE

## 2024-08-29 NOTE — PROGRESS NOTES
Specialty Pharmacy Refill Coordination Note     Michelle is a 54 y.o. female contacted today regarding refills of  Metformin and Omnipod specialty medication(s).    Reviewed and verified with patient:       Specialty medication(s) and dose(s) confirmed: yes    Refill Questions      Flowsheet Row Most Recent Value   Changes to allergies? No   Changes to medications? No   New conditions or infections since last clinic visit No   Unplanned office visit, urgent care, ED, or hospital admission in the last 4 weeks  No   How does patient/caregiver feel medication is working? Very good   Financial problems or insurance changes  No   Since the previous refill, were any specialty medication doses or scheduled injections missed or delayed?  No   Why were doses missed? na   Does this patient require a clinical escalation to a pharmacist? No            Delivery Questions      Flowsheet Row Most Recent Value   Delivery method [employer]   Delivery address verified with patient/caregiver? Yes   Delivery address Home   Number of medications in delivery 2   Medication(s) being filled and delivered Metformin Hcl (Biguanides), Insulin Disposable Pump   Doses left of specialty medications na   Copay verified? No   Copay amount na   Copay form of payment No copayment ($0)   Ship Date 09/03   Delivery Date 09/04   Signature Required No                   Follow-up: 30 day(s)     Ginger Clayton Pharmacy Technician  Specialty Pharmacy Technician

## 2024-09-06 ENCOUNTER — SPECIALTY PHARMACY (OUTPATIENT)
Dept: PHARMACY | Facility: HOSPITAL | Age: 54
End: 2024-09-06
Payer: MEDICARE

## 2024-09-06 RX ORDER — LAMOTRIGINE 100 MG/1
100 TABLET ORAL DAILY
COMMUNITY

## 2024-09-06 NOTE — PROGRESS NOTES
"   Specialty Pharmacy Patient Management Program  Endocrinology Reassessment     Michelle Estrada is a 54 y.o. female seen by an Endocrinology Provider for Type 2 Diabetes and enrolled in the Endocrinology Patient Management program offered by [facility].  A follow-up outreach was conducted, including assessment of continued therapy appropriateness, medication adherence, and side effect incidence and management for metformin, Trulicity, and insulin therapy.     Patient is currently taking Trulicity 4.5 mg weekly, Metformin  mg BID, and Novolog 20 uints BID. She reports not using insulin pump and Dexcom for \"a while\" because of cost. She has Toujeo at home in case of insulin pump failure, but has not used it during this time without wearing pump. She reports constipation with Trulicity and Metformin therapy. She reports using docusate to manage side effect. She denies missed doses of current regimen. Patient reports her BG have been averaging in mid 200s. She reports one event of hypoglycemia. She reports she did not check her BG during this time but felt very sick and corrected low with food.     Changes to Insurance Coverage or Financial Support  None      Relevant Past Medical History and Comorbidities  Relevant medical history and concomitant health conditions were discussed with the patient. The patient's chart has been reviewed for relevant past medical history and comorbid health conditions and updated as necessary.   Past Medical History:   Diagnosis Date    Anxiety     Bipolar 1 disorder     COPD (chronic obstructive pulmonary disease)     Depression     Diabetes mellitus     Fibromyalgia     Gestational diabetes 2001    Hypoglycemia 1994    Neuropathy     Polycystic ovary syndrome 2001    PTSD (post-traumatic stress disorder)     Vertigo     Vitamin D deficiency ???     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 The patient's chart has been reviewed for allergy information and updated as necessary.   Allergies   Allergen Reactions    Dilantin [Phenytoin] Hives    Lisinopril Cough    Other Other (See Comments)     ADVICOR - pt stated she was \"boiling from the inside out\"       Allergies reviewed by Tamara Meadows, PharmD on 9/6/2024 at  2:27 PM    Relevant Laboratory Values  A1C Last 3 Results          12/15/2023    08:43 3/19/2024    09:56   HGBA1C Last 3 Results   Hemoglobin A1C 7.1  8.0      Lab Results   Component Value Date    HGBA1C 8.0 (A) 03/19/2024     Lab Results   Component Value Date    GLUCOSE 94 01/30/2022    CALCIUM 8.8 (L) 02/08/2022     02/08/2022    K 4.6 02/08/2022    CO2 26 02/08/2022     02/08/2022    BUN 11 02/08/2022    CREATININE 0.80 02/08/2022    EGFRIFAFRI >60 02/08/2022    EGFRIFNONA >60 02/08/2022    BCR 14 02/08/2022    ANIONGAP 10 02/08/2022     No results found for: \"CHOL\", \"CHLPL\", \"TRIG\", \"HDL\", \"LDL\", \"LDLDIRECT\"      Current Medication List  This medication list has been reviewed with the patient and evaluated for any interactions or necessary " modifications/recommendations, and updated to include all prescription medications, OTC medications, and supplements the patient is currently taking. This list reflects what is contained in the patient's profile, which has also been marked as reviewed to communicate to other providers it is the most up to date version of the patient's current medication therapy.     Current Outpatient Medications:     budesonide-formoterol (SYMBICORT) 160-4.5 MCG/ACT inhaler, Inhale 2 puffs 2 (Two) Times a Day., Disp: , Rfl:     Cariprazine HCl (VRAYLAR) 1.5 MG capsule capsule, Take 1 capsule by mouth Daily., Disp: , Rfl:     DOCUSATE SODIUM PO, Take  by mouth Daily., Disp: , Rfl:     Dulaglutide (Trulicity) 4.5 MG/0.5ML solution pen-injector, Inject 0.5 mL under the skin into the appropriate area as directed 1 (One) Time Per Week., Disp: 6 mL, Rfl: 1    escitalopram (LEXAPRO) 10 MG tablet, Take 1 tablet by mouth Daily. (Patient taking differently: Take 2 tablets by mouth Daily.), Disp: 90 tablet, Rfl: 0    fenofibrate (TRICOR) 145 MG tablet, Take 1 tablet by mouth Daily., Disp: 90 tablet, Rfl: 2    fluticasone (FLONASE) 50 MCG/ACT nasal spray, 2 sprays into the nostril(s) as directed by provider Daily., Disp: , Rfl:     glucose blood test strip, Use as instructed to monitor BG once daily, Disp: 100 each, Rfl: 5    hydrOXYzine (ATARAX) 10 MG tablet, Daily As Needed., Disp: , Rfl:     Insulin Aspart (NovoLOG) 100 UNIT/ML injection, For use in insulin pump. Maximum Daily Dose: 100 units., Disp: 90 mL, Rfl: 1    Insulin Pen Needle 32G X 4 MM misc, Use to inject insulin as directed up to 4 times daily., Disp: 100 each, Rfl: 5    lamoTRIgine (LaMICtal) 100 MG tablet, Take 1 tablet by mouth Daily., Disp: , Rfl:     magnesium oxide (MAGOX) 400 (241.3 Mg) MG tablet tablet, Take 1 tablet by mouth Daily., Disp: , Rfl:     meclizine (ANTIVERT) 25 MG tablet, Take 1 tablet by mouth 2 (Two) Times a Day., Disp: , Rfl:     metFORMIN ER  (GLUCOPHAGE-XR) 500 MG 24 hr tablet, Take 1 tablet by mouth 2 (Two) Times a Day., Disp: 180 tablet, Rfl: 1    montelukast (SINGULAIR) 10 MG tablet, Take 1 tablet by mouth., Disp: , Rfl:     rosuvastatin (CRESTOR) 20 MG tablet, Take 1 tablet by mouth Daily., Disp: 90 tablet, Rfl: 1    terbinafine (lamiSIL) 250 MG tablet, 1 tablet. Takes 1 tablet by mouth on the first 7 days of the month., Disp: , Rfl:     VITAMIN D PO, Take  by mouth Daily., Disp: , Rfl:     Continuous Blood Gluc  (Dexcom G6 ) device, 1 each Continuous. (Patient not taking: Reported on 9/6/2024), Disp: 1 each, Rfl: 0    Continuous Blood Gluc Sensor (Dexcom G6 Sensor), Use Every 10 (Ten) Days. (Patient not taking: Reported on 9/6/2024), Disp: 3 each, Rfl: 11    Continuous Blood Gluc Transmit (Dexcom G6 Transmitter) misc, Use 1 each Every 3 (Three) Months. (Patient not taking: Reported on 9/6/2024), Disp: 1 each, Rfl: 3    Insulin Disposable Pump (Omnipod 5 G6 Pods, Gen 5,) misc, Use 1 pod Every Other Day. (Patient not taking: Reported on 9/6/2024), Disp: 45 each, Rfl: 1    Insulin Glargine, 2 Unit Dial, (Toujeo Max SoloStar) 300 UNIT/ML solution pen-injector injection, Inject 60 Units under the skin at bedtime. (Patient not taking: Reported on 10/19/2023), Disp: 18 mL, Rfl: 1    Medicines reviewed by Tamara Meadows, PharmD on 9/6/2024 at  2:27 PM    Drug Interactions  No significant drug-drug interactions with diabetes medications expected according to literature.  The hypoglycemic effect of Insulin may be increased by fenofibrate     Recommended Medications Assessment  Aspirin: Currently Taking   Statin: Currently Taking   ACEi/ARB: Not Taking Currently    Adverse Drug Reactions  Adverse Reactions Experienced: Constipation  Medication tolerability: Tolerating with no to minimal ADRs  Medication plan: Continue therapy with normal follow-up  Plan for ADR Management: Notified provider    Adherence, Self-Administration, and Current Therapy  Problems  Adherence related to the patient's specialty therapy was discussed with the patient. The Adherence segment of this outreach has been reviewed and updated.   Adherence Questions  Linked Medication(s) Assessed: Dulaglutide, Insulin Aspart  On average, how many doses/injections does the patient miss per month?: 0  What are the identified reasons for non-adherence or missed doses? : no problems identified  What is the estimated medication adherence level?: %  Based on the patient/caregiver response and refill history, does this patient require an MTP to track adherence improvements?: no    Additional Barriers to Patient Self-Administration: None  Methods for Supporting Patient Self-Administration: None required    Recently Close Medication Therapy Problems  No medication therapy recommendations to display    Goals of Therapy  Goals related to the patient's specialty therapy was discussed with the patient. The Patient Goals segment of this outreach has been reviewed and updated.    Goals Addressed Today        Specialty Pharmacy General Goal      Prevent hypoglycemia             Quality of Life Assessment   Quality of Life related to the patient's enrollment in the patient management program and services provided was discussed with the patient. The QOL segment of this outreach has been reviewed and updated.   Quality of Life Improvement Scale: 9-A good deal better    Reassessment Plan & Follow-Up  Medication Therapy Changes:   Notified provider of insulin pump therapy interruption. Recommended to add basal insulin.   Per Lana Adorno, APRN: Start Toujeo 20 units nightly. Instructed patient on change in regimen (9/9/24). She was agreeable to plan.   Related Plans, Therapy Recommendations, or Issues to Be Addressed: None   Pharmacist to perform regular reassessments no more than (6) months from the previous assessment.  Care Coordinator to set up future refill outreaches, coordinate prescription  delivery, and escalate clinical questions to pharmacist.     Attestation  I attest the patient was actively involved in and has agreed to the above plan of care. If the prescribed therapy is at any point deemed not appropriate based on the current or future assessments, a consultation will be initiated with the patient's specialty care provider to determine the best course of action. The revised plan of therapy will be documented along with any required assessments and/or additional patient education provided.    Tamara Meadows, PharmD, [facility] SALAZAR  Clinical Specialty Pharmacist, Endocrinology  9/9/2024  16:05 EDT

## 2024-09-11 ENCOUNTER — SPECIALTY PHARMACY (OUTPATIENT)
Dept: PHARMACY | Facility: HOSPITAL | Age: 54
End: 2024-09-11
Payer: MEDICARE

## 2024-10-17 ENCOUNTER — SPECIALTY PHARMACY (OUTPATIENT)
Dept: PHARMACY | Facility: HOSPITAL | Age: 54
End: 2024-10-17
Payer: MEDICARE

## 2024-10-17 ENCOUNTER — OFFICE VISIT (OUTPATIENT)
Dept: ENDOCRINOLOGY | Facility: CLINIC | Age: 54
End: 2024-10-17
Payer: MEDICARE

## 2024-10-17 VITALS
BODY MASS INDEX: 39.03 KG/M2 | DIASTOLIC BLOOD PRESSURE: 75 MMHG | OXYGEN SATURATION: 95 % | HEART RATE: 89 BPM | WEIGHT: 227.4 LBS | SYSTOLIC BLOOD PRESSURE: 140 MMHG

## 2024-10-17 DIAGNOSIS — E11.65 TYPE 2 DIABETES MELLITUS WITH HYPERGLYCEMIA, WITH LONG-TERM CURRENT USE OF INSULIN: ICD-10-CM

## 2024-10-17 DIAGNOSIS — Z79.4 TYPE 2 DIABETES MELLITUS WITH HYPERGLYCEMIA, WITH LONG-TERM CURRENT USE OF INSULIN: Primary | ICD-10-CM

## 2024-10-17 DIAGNOSIS — Z79.4 TYPE 2 DIABETES MELLITUS WITH HYPERGLYCEMIA, WITH LONG-TERM CURRENT USE OF INSULIN: ICD-10-CM

## 2024-10-17 DIAGNOSIS — E11.65 TYPE 2 DIABETES MELLITUS WITH HYPERGLYCEMIA, WITH LONG-TERM CURRENT USE OF INSULIN: Primary | ICD-10-CM

## 2024-10-17 LAB
EXPIRATION DATE: ABNORMAL
GLUCOSE BLDC GLUCOMTR-MCNC: 444 MG/DL (ref 70–130)
HBA1C MFR BLD: 10.6 % (ref 4.5–5.7)
Lab: ABNORMAL

## 2024-10-17 PROCEDURE — 82570 ASSAY OF URINE CREATININE: CPT | Performed by: NURSE PRACTITIONER

## 2024-10-17 PROCEDURE — 82043 UR ALBUMIN QUANTITATIVE: CPT | Performed by: NURSE PRACTITIONER

## 2024-10-17 RX ORDER — INSULIN PMP CART,AUT,G6/7,CNTR
1 EACH SUBCUTANEOUS EVERY OTHER DAY
Qty: 45 EACH | Refills: 1 | Status: SHIPPED | OUTPATIENT
Start: 2024-10-17

## 2024-10-17 RX ORDER — METFORMIN HCL 500 MG
500 TABLET, EXTENDED RELEASE 24 HR ORAL 2 TIMES DAILY
Qty: 180 TABLET | Refills: 1 | Status: SHIPPED | OUTPATIENT
Start: 2024-10-17

## 2024-10-17 RX ORDER — FOLIC ACID 1 MG/1
1 TABLET ORAL DAILY
COMMUNITY

## 2024-10-17 RX ORDER — INSULIN ASPART 100 [IU]/ML
INJECTION, SOLUTION INTRAVENOUS; SUBCUTANEOUS
Qty: 90 ML | Refills: 1 | Status: SHIPPED | OUTPATIENT
Start: 2024-10-17

## 2024-10-17 RX ORDER — DULAGLUTIDE 4.5 MG/.5ML
4.5 INJECTION, SOLUTION SUBCUTANEOUS WEEKLY
Qty: 6 ML | Refills: 1 | Status: SHIPPED | OUTPATIENT
Start: 2024-10-17
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
## 2024-10-17 NOTE — PROGRESS NOTES
Chief Complaint   Patient presents with    Follow-up        Michelle Estrada is a 54 y.o. female had concerns including Follow-up.    T2DM.    She has been in a manic phase and has been off of her medication.She reports that her daughter normally makes sure that she is taking her medication regularly and her daughter has not been doing mentally well either and they have not been caring for themselves. She is not sure how long she has been off of them.  She has started back on Metformin and Trulicity-when she remembers, varying insulin injections typically 20 units TID with meals.  She is waiting to get in to see a provider at [facility].  She should know on Friday when her upcoming appt is.    History:  She is checking blood sugars with Dexcom CGM.  Current medications for diabetes include Trulicity 4.5 mg weekly-tolerating this well, Novolog in an Omnipod insulin pump, Metformin 500 mg BID.  Hypos: rarely  Last optho exam: 2022-Dr. Horton    Most recent A1C:  5.7 (5/11/2022)    The following portions of the patient's history were reviewed and updated as appropriate: allergies, current medications, past family history, past medical history, past social history, past surgical history and problem list.      Review of Systems   Constitutional:  Negative for fatigue.   Gastrointestinal:  Positive for nausea and vomiting.   Endocrine: Positive for polydipsia and polyphagia. Negative for polyuria.   Psychiatric/Behavioral:  Positive for sleep disturbance.    All other systems reviewed and are negative.     Physical Exam  Vitals reviewed.   Constitutional:       Appearance: Normal appearance.   Cardiovascular:      Rate and Rhythm: Normal rate.   Pulmonary:      Effort: Pulmonary effort is normal.   Neurological:      Mental Status: She is alert and oriented to person, place, and time.   Psychiatric:         Mood and Affect: Mood normal.         Behavior: Behavior normal.         Thought Content: Thought content normal.  "        Judgment: Judgment normal.        /75 (BP Location: Left arm, Patient Position: Sitting, Cuff Size: Adult)   Pulse 89   Wt 103 kg (227 lb 6.4 oz)   SpO2 95%   BMI 39.03 kg/m²      Labs and imaging    CMP:  Lab Results   Component Value Date     (H) 02/08/2022    BUN 11 02/08/2022    CREATININE 0.80 02/08/2022    EGFRIFNONA >60 02/08/2022    EGFRIFAFRI >60 02/08/2022    BCR 14 02/08/2022     02/08/2022    K 3.7 08/05/2024    CO2 26 02/08/2022    CALCIUM 8.8 (L) 02/08/2022    ALBUMIN 3.5 02/08/2022    BILITOT 0.3 02/08/2022    ALKPHOS 88 02/08/2022    AST 21 02/08/2022    ALT 22 02/08/2022     Lipid Panel:  No results found for: \"CHOL\", \"TRIG\", \"HDL\", \"VLDL\", \"LDL\"    HbA1c:  Lab Results   Component Value Date    HGBA1C 10.6 (A) 10/17/2024    HGBA1C 8.0 (A) 03/19/2024     Glucose:      Lab Results   Component Value Date    POCGLU 444 (A) 10/17/2024     Microalbumin:  Lab Results   Component Value Date    MALBCRERATIO 21 10/17/2024     TSH:  No results found for: \"TSH\"    Assessment and plan  Diagnoses and all orders for this visit:    1. Type 2 diabetes mellitus with hyperglycemia, with long-term current use of insulin (Primary)  Assessment & Plan:  -Diabetes is worsening with A1c 10.6.  -Discussed importance of dietary and exercise guidelines.  -Continue Metformin 500 mg BID.  -Continue Trulicity 4.5 mg weekly. Patient has no personal history of pancreatitis, no family history of MEN syndrome or medullary thyroid cancer. Possible side effects including nausea, bloating, other GI upset and rarely pancreatitis were discussed. She was advised to call the office with any symptoms or concerns.   -Continue using Dexcom CGM.   -Start using insulin pump regularly.  -IN depth discussed with patient pertaining to her mental health and seeking appropriate care.  Discussed that if she is not able to get into [facility] or it is going to be some time prior to getting an appointment, to notify the office " for referral to [facility] for assessment and treatment.  She notifies that she will let me know.  Discussed that it is adamant that she start caring for her diabetes as she will likely start to have additional macro and micro complications in relation to her elevated BG's.  Discussed prevention of these by lowering BG's to goal.    -Schedule a diabetic eye exam.  -Follow-up in 1 month.    Orders:  -     POC Glycosylated Hemoglobin (Hb A1C)  -     POC Glucose Fingerstick  -     Microalbumin / Creatinine Urine Ratio - Urine, Clean Catch         Return in about 4 weeks (around 11/14/2024) for Follow-up appointment. The patient was instructed to contact the clinic with any interval questions or concerns.    This document has been electronically signed by JIN Jara  October 20, 2024 00:39 EDT  Endocrinology    Please note that portions of this document were completed with a voice recognition program. Efforts were made to edit the dictations, but occasionally words are mis-transcribed.

## 2024-10-19 LAB
ALBUMIN/CREAT UR: 21 MG/G CREAT (ref 0–29)
CREAT UR-MCNC: 64.8 MG/DL
MICROALBUMIN UR-MCNC: 13.9 UG/ML

## 2024-10-20 NOTE — ASSESSMENT & PLAN NOTE
-Diabetes is worsening with A1c 10.6.  -Discussed importance of dietary and exercise guidelines.  -Continue Metformin 500 mg BID.  -Continue Trulicity 4.5 mg weekly. Patient has no personal history of pancreatitis, no family history of MEN syndrome or medullary thyroid cancer. Possible side effects including nausea, bloating, other GI upset and rarely pancreatitis were discussed. She was advised to call the office with any symptoms or concerns.   -Continue using Dexcom CGM.   -Start using insulin pump regularly.  -IN depth discussed with patient pertaining to her mental health and seeking appropriate care.  Discussed that if she is not able to get into [facility] or it is going to be some time prior to getting an appointment, to notify the office for referral to [facility] for assessment and treatment.  She notifies that she will let me know.  Discussed that it is adamant that she start caring for her diabetes as she will likely start to have additional macro and micro complications in relation to her elevated BG's.  Discussed prevention of these by lowering BG's to goal.    -Schedule a diabetic eye exam.  -Follow-up in 1 month.

## 2024-11-11 RX ORDER — ACYCLOVIR 400 MG/1
1 TABLET ORAL
Qty: 3 EACH | Refills: 5 | Status: SHIPPED | OUTPATIENT
Start: 2024-11-11

## 2024-11-21 ENCOUNTER — SPECIALTY PHARMACY (OUTPATIENT)
Dept: PHARMACY | Facility: HOSPITAL | Age: 54
End: 2024-11-21
Payer: MEDICARE

## 2025-01-02 RX ORDER — NAPROXEN SODIUM 220 MG
1 TABLET ORAL 3 TIMES DAILY
Qty: 100 EACH | Refills: 0 | Status: SHIPPED | OUTPATIENT
Start: 2025-01-02

## 2025-01-13 ENCOUNTER — TELEPHONE (OUTPATIENT)
Dept: ENDOCRINOLOGY | Facility: CLINIC | Age: 55
End: 2025-01-13
Payer: MEDICARE

## 2025-01-13 NOTE — TELEPHONE ENCOUNTER
Her insulin pump settings are as below:  Basal Rate: 12A-12A: 1.3 u/hr  Carb Ratio: 1:7.5  Correction Factor: 1:28  Target Glucose: 110  Correct Above: 110  Let me know if she needs any additional settings.

## 2025-01-13 NOTE — TELEPHONE ENCOUNTER
Patient called because she is trying to get her new controller set up for Omnipod and needs her settings. Please advise.

## 2025-03-04 ENCOUNTER — SPECIALTY PHARMACY (OUTPATIENT)
Dept: PHARMACY | Facility: HOSPITAL | Age: 55
End: 2025-03-04
Payer: MEDICARE

## 2025-03-04 RX ORDER — ALBUTEROL SULFATE 90 UG/1
INHALANT RESPIRATORY (INHALATION)
COMMUNITY
Start: 2023-08-10

## 2025-03-04 RX ORDER — IBUPROFEN 800 MG/1
800 TABLET, FILM COATED ORAL EVERY 8 HOURS PRN
COMMUNITY

## 2025-03-04 NOTE — PROGRESS NOTES
Specialty Pharmacy Patient Management Program  Endocrinology Reassessment     Michelle Estrada is a 54 y.o. female seen by an Endocrinology Provider for Type 2 Diabetes and enrolled in the Endocrinology Patient Management program offered by [facility].  A follow-up outreach was conducted, including assessment of continued therapy appropriateness, medication adherence, and side effect incidence and management for metformin and insulin therapy.     The pt states that she currently does not have her Omnipod running due to not being able to log into her pump diana.  Pt states that she has tried to contact Omnipod to resolve this issue but was not successful.  Pt also does not have her Dexcom G7 sensors placed due to running out of her supply recently.  Pt states that she is waiting on a refill for the sensors from the [facility] pharmacy.  Pt states that she actually stopped all of her medications recently due to experiencing vomiting after taking her medications.  Pt has slowly re-introduced her medications but never restarted the Trulicity.  Pt denies any vomiting symptoms at this time.    At this time, the pt reports taking Metformin XR 500mg po bid and Novolog 10 units throughout the day if her BG >250; per pt she doesn't take the Novolog in regard to meals but will do it as needed throughout the day.      The pt reports being 90-95% compliant with her diabetic medications when she is able to be connected to her pump, however she reports not being connected to the pump for two months now.  Pt denies any low BG < 70 episodes recently.  Pt denies any side effects with her diabetic medications at the moment.  Pt states that when she checks her BG it ranges from 120 to less than 250.    Notified Lana ABDI of all information in this section of the note.    The pt denies any personal or family history of thyroid cancer, denies any issues with pancreatitis, and reports recurrent UTI's/yeast  "infections.    Changes to Insurance Coverage or Financial Support  Pt now has [employer] insurance as of 2025.    Relevant Past Medical History and Comorbidities  Relevant medical history and concomitant health conditions were discussed with the patient. The patient's chart has been reviewed for relevant past medical history and comorbid health conditions and updated as necessary.   Past Medical History:   Diagnosis Date    Anxiety     Bipolar 1 disorder     COPD (chronic obstructive pulmonary disease)     Depression     Diabetes mellitus     Fibromyalgia     Gestational diabetes 2001    Hypoglycemia 1994    Neuropathy     Polycystic ovary syndrome 2001    PTSD (post-traumatic stress disorder)     Vertigo     Vitamin D deficiency ???     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 The patient's chart has been reviewed for allergy information and updated as necessary.   Allergies   Allergen Reactions    Dilantin [Phenytoin] Hives     Pt not sure what happened with this medication but knew reaction was " "severe    Lisinopril Cough    Other Other (See Comments)     ADVICOR - pt stated she was \"boiling from the inside out\"    Wound Dressing Adhesive Other (See Comments)     Blister with adhesives       Allergies reviewed by Marcin Abbott RP on 3/4/2025 at  8:54 AM  Allergies reviewed by Marcin Abbott RPH on 3/4/2025 at 11:57 AM    Relevant Laboratory Values  A1C Last 3 Results          3/19/2024    09:56 10/17/2024    15:47   HGBA1C Last 3 Results   Hemoglobin A1C 8.0  10.6      Lab Results   Component Value Date    HGBA1C 10.6 (A) 10/17/2024     Lab Results   Component Value Date    GLUCOSE 94 01/30/2022    CALCIUM 8.8 (L) 02/08/2022     02/08/2022    K 3.7 08/05/2024    CO2 26 02/08/2022     02/08/2022    BUN 11 02/08/2022    CREATININE 0.80 02/08/2022    EGFRIFAFRI >60 02/08/2022    EGFRIFNONA >60 02/08/2022    BCR 14 02/08/2022    ANIONGAP 10 02/08/2022     No results found for: \"CHOL\", \"CHLPL\", \"TRIG\", \"HDL\", \"LDL\", \"LDLDIRECT\"  Microalbumin          10/17/2024    16:21   Microalbumin   Microalbumin, Urine 13.9        Current Medication List  This medication list has been reviewed with the patient and evaluated for any interactions or necessary modifications/recommendations, and updated to include all prescription medications, OTC medications, and supplements the patient is currently taking. This list reflects what is contained in the patient's profile, which has also been marked as reviewed to communicate to other providers it is the most up to date version of the patient's current medication therapy.     Current Outpatient Medications:     albuterol sulfate  (90 Base) MCG/ACT inhaler, INHALE 2 PUFFS BY MOUTH VIA INHALER EVERY 6 (SIX) HOURS AS NEEDED., Disp: , Rfl:     budesonide-formoterol (SYMBICORT) 160-4.5 MCG/ACT inhaler, Inhale 2 puffs 2 (Two) Times a Day., Disp: , Rfl:     Cariprazine HCl (VRAYLAR) 3 MG capsule capsule, Take 1 capsule by mouth Daily., Disp: , Rfl:     Continuous " "Glucose Sensor (Dexcom G7 Sensor) misc, Use 1 each Every 10 (Ten) Days., Disp: 3 each, Rfl: 5    escitalopram (LEXAPRO) 10 MG tablet, Take 1 tablet by mouth Daily. (Patient taking differently: Take 2 tablets by mouth Daily.), Disp: 90 tablet, Rfl: 0    fenofibrate (TRICOR) 145 MG tablet, Take 1 tablet by mouth Daily., Disp: 90 tablet, Rfl: 2    folic acid (FOLVITE) 1 MG tablet, Take 1 tablet by mouth Daily., Disp: , Rfl:     glucose blood test strip, Use as instructed to monitor BG once daily, Disp: 100 each, Rfl: 5    Insulin Aspart (NovoLOG) 100 UNIT/ML injection, For use in insulin pump. Maximum Daily Dose: 100 units., Disp: 90 mL, Rfl: 1    Insulin Syringe 30G X 5/16\" 1 ML misc, Use 1 each 3 times a day., Disp: 100 each, Rfl: 0    lamoTRIgine (LaMICtal) 100 MG tablet, Take 1 tablet by mouth Daily., Disp: , Rfl:     magnesium oxide (MAGOX) 400 (241.3 Mg) MG tablet tablet, Take 1 tablet by mouth Daily., Disp: , Rfl:     meclizine (ANTIVERT) 25 MG tablet, Take 1 tablet by mouth 2 (Two) Times a Day., Disp: , Rfl:     metFORMIN ER (GLUCOPHAGE-XR) 500 MG 24 hr tablet, Take 1 tablet by mouth 2 (Two) Times a Day., Disp: 180 tablet, Rfl: 1    montelukast (SINGULAIR) 10 MG tablet, Take 1 tablet by mouth Every Night., Disp: , Rfl:     rosuvastatin (CRESTOR) 20 MG tablet, Take 1 tablet by mouth Daily., Disp: 90 tablet, Rfl: 1    terbinafine (lamiSIL) 250 MG tablet, Take 1 tablet by mouth on the first 7 days of the month., Disp: , Rfl:     VITAMIN D PO, Take 1 tablet by mouth Daily., Disp: , Rfl:     fluticasone (FLONASE) 50 MCG/ACT nasal spray, Administer 2 sprays into the nostril(s) as directed by provider Daily. (Patient not taking: Reported on 3/4/2025), Disp: , Rfl:     hydrOXYzine (ATARAX) 10 MG tablet, Take 1 tablet by mouth Every 6 (Six) Hours As Needed for Anxiety., Disp: , Rfl:     ibuprofen (ADVIL,MOTRIN) 800 MG tablet, Take 1 tablet by mouth Every 8 (Eight) Hours As Needed., Disp: , Rfl:     Insulin Disposable " Pump (Omnipod 5 [id number] Pods Gen 5) misc, Use 1 pod Every Other Day. (Patient not taking: Reported on 3/4/2025), Disp: 45 each, Rfl: 1    Insulin Glargine, 2 Unit Dial, (Toujeo Max SoloStar) 300 UNIT/ML solution pen-injector injection, Inject 60 Units under the skin at bedtime. (Patient not taking: Reported on 3/4/2025), Disp: 18 mL, Rfl: 1    Insulin Pen Needle 32G X 4 MM misc, Use to inject insulin as directed up to 4 times daily. (Patient not taking: Reported on 3/4/2025), Disp: 100 each, Rfl: 5    Medicines reviewed by Marcin Abbott, [facility] on 3/4/2025 at 11:56 AM    Drug Interactions with diabetic medications  -Lexapro can enhance the hypoglycemic effect of antidiabetic agents.  -Vraylar may diminish the effect of antidiabetic agents.  -Ibuprofen may enhance the adverse effects of metformin, including lactic acidosis.  Pt states that she only takes the Ibuprofen prn.    Recommended Medications Assessment  Aspirin: Not currently taking  Statin: Currently Taking   ACEi/ARB: Not Taking Currently    Adverse Drug Reactions  Adverse Reactions Experienced: None  Medication tolerability: Tolerating with no to minimal ADRs  Medication plan: Continue therapy with closer monitoring (Pt not currently using Omnipod due to issues logging in; will reach out to pt sooner than 6 months to ensure Omnipod is resumed)  Plan for ADR Management: None required    Adherence, Self-Administration, and Current Therapy Problems  Adherence related to the patient's specialty therapy was discussed with the patient. The Adherence segment of this outreach has been reviewed and updated.   Adherence Questions  Linked Medication(s) Assessed: Continuous Glucose Sensor (Dexcom G7 Sensor), Insulin Aspart (novoLOG), Insulin Disposable Pump (Omnipod 5 [id number] Pods Gen 5)  On average, how many doses/injections does the patient miss per month?: >10  What are the identified reasons for non-adherence or missed doses? : other  List other reason(s) for  non-adherence or missed doses: Pt not able to log into Omnipod and hasn't used for around 2 months  What is the estimated medication adherence level?: <70%  Based on the patient/caregiver response and refill history, does this patient require an MTP to track adherence improvements?: yes    Additional Barriers to Patient Self-Administration: The pt states that she currently does not have her Omnipod running due to not being able to log into her pump diana. Pt states that she has tried to contact Omnipod to resolve this issue but was not successful. Pt states that in the meantime she has been administering Novolog 10 units throughout the day if her BG >250; per pt she doesn't take the Novolog in regard to meals but will do it as needed throughout the day.   Methods for Supporting Patient Self-Administration: Provided pt with direct phone number for Omnipod rep to assist with getting logged in. Discussed case with provider Lana ABDI who gave the option for the pt to come in for assistance with pump or she can take Toujeo 30 units daily and Novolog 10 units prior to meals until her f/u appt this month. Pt opted to call the Omnipod rep for assistance and inject the scheduled Toujeo and Novolog.  Will monitor at pt's next appt on 3/24/25.       Goals of Therapy  Goals related to the patient's specialty therapy was discussed with the patient. The Patient Goals segment of this outreach has been reviewed and updated.    Goals Addressed Today        HEMOGLOBIN A1C < 7       Pt not at goal as most recent A1c = 10.6%. Provided pt with Omnipod rep contact information to assist her with getting logged into the Omnipod diana so it can be resumed and increase adherence.            Quality of Life Improvement Scale: 8-Moderately better  Reassessment Plan & Follow-Up  Patient diabetes is not controlled with most recent A1c at 10.6%.  Medication Therapy Changes:   Notified provider of insulin pump therapy interruption. Per  provider pt could come to the office for assistance with pump or she could inject Toujeo 30 units subq daily and Novolog 10 units subq prior to meals until her f/u appt on 3/24/25.  Pt opted to call the Omnipod rep for assistance and inject the scheduled Toujeo and Novolog. Pt states that she has Toujeo and Novolog on hand and will call if she needs any refills.  Continue Dexcom G7 sensors  Related Plans, Therapy Recommendations, or Issues to Be Addressed:   MTP created to follow Omnipod adherence.  Instructed pt to call Endocrinology office if she doesn't receive Dexcom G7 sensor from [facility] pharmacy or if she can't get ahold of the Omnipod rep.  Recommend lipid panel be drawn at next visit.  Recommended Prevnar-20 and yearly flu vaccine.  Pt was educated to take her Novolog only prior to meals and that taking it not with a meal can cause a hypoglycemia episode.  Pharmacist to perform regular reassessments no more than (6) months from the previous assessment.  Care Coordinator to set up future refill outreaches, coordinate prescription delivery, and escalate clinical questions to pharmacist.     Attestation  I attest the patient was actively involved in and has agreed to the above plan of care. If the prescribed therapy is at any point deemed not appropriate based on the current or future assessments, a consultation will be initiated with the patient's specialty care provider to determine the best course of action. The revised plan of therapy will be documented along with any required assessments and/or additional patient education provided.    Marcin Abbott RPH  03/04/25  12:05 EST

## 2025-03-24 ENCOUNTER — SPECIALTY PHARMACY (OUTPATIENT)
Dept: PHARMACY | Facility: HOSPITAL | Age: 55
End: 2025-03-24
Payer: MEDICARE

## 2025-03-24 ENCOUNTER — OFFICE VISIT (OUTPATIENT)
Dept: ENDOCRINOLOGY | Facility: CLINIC | Age: 55
End: 2025-03-24
Payer: MEDICARE

## 2025-03-24 VITALS
SYSTOLIC BLOOD PRESSURE: 125 MMHG | OXYGEN SATURATION: 94 % | BODY MASS INDEX: 39.1 KG/M2 | DIASTOLIC BLOOD PRESSURE: 61 MMHG | HEART RATE: 77 BPM | WEIGHT: 227.8 LBS

## 2025-03-24 DIAGNOSIS — Z79.4 TYPE 2 DIABETES MELLITUS WITH HYPERGLYCEMIA, WITH LONG-TERM CURRENT USE OF INSULIN: Primary | ICD-10-CM

## 2025-03-24 DIAGNOSIS — E11.65 TYPE 2 DIABETES MELLITUS WITH HYPERGLYCEMIA, WITH LONG-TERM CURRENT USE OF INSULIN: Primary | ICD-10-CM

## 2025-03-24 LAB
EXPIRATION DATE: ABNORMAL
EXPIRATION DATE: ABNORMAL
GLUCOSE BLDC GLUCOMTR-MCNC: 235 MG/DL (ref 70–130)
HBA1C MFR BLD: 8 % (ref 4.5–5.7)
Lab: ABNORMAL
Lab: ABNORMAL

## 2025-03-24 PROCEDURE — G2211 COMPLEX E/M VISIT ADD ON: HCPCS | Performed by: NURSE PRACTITIONER

## 2025-03-24 PROCEDURE — 83036 HEMOGLOBIN GLYCOSYLATED A1C: CPT | Performed by: NURSE PRACTITIONER

## 2025-03-24 PROCEDURE — 1159F MED LIST DOCD IN RCRD: CPT | Performed by: NURSE PRACTITIONER

## 2025-03-24 PROCEDURE — 99214 OFFICE O/P EST MOD 30 MIN: CPT | Performed by: NURSE PRACTITIONER

## 2025-03-24 PROCEDURE — 1160F RVW MEDS BY RX/DR IN RCRD: CPT | Performed by: NURSE PRACTITIONER

## 2025-03-24 PROCEDURE — 82947 ASSAY GLUCOSE BLOOD QUANT: CPT | Performed by: NURSE PRACTITIONER

## 2025-03-24 PROCEDURE — 3052F HG A1C>EQUAL 8.0%<EQUAL 9.0%: CPT | Performed by: NURSE PRACTITIONER

## 2025-03-24 RX ORDER — BUSPIRONE HYDROCHLORIDE 10 MG/1
1 TABLET ORAL EVERY 12 HOURS SCHEDULED
COMMUNITY
Start: 2025-03-12

## 2025-03-24 NOTE — ASSESSMENT & PLAN NOTE
-Diabetes is improving with A1c down from 10.6 to 8.0.  -Discussed importance of dietary and exercise guidelines.  -Continue Metformin 500 mg BID.  -Continue Trulicity 4.5 mg weekly. Patient has no personal history of pancreatitis, no family history of MEN syndrome or medullary thyroid cancer. Possible side effects including nausea, bloating, other GI upset and rarely pancreatitis were discussed. She was advised to call the office with any symptoms or concerns.   -Start re-using Dexcom CGM. Will send in RX to DME for this.   -Start using insulin pump regularly once she is able to get supplies when her insurance issue is resolved.   -Increase Toujeo 40 units at night.  -Increase Novolog 15 units with meals.  -Schedule a diabetic eye exam.  -S?S hypoglycemia reviewed with Rule of 15's advised.   -Follow-up in 1 month.

## 2025-03-24 NOTE — PROGRESS NOTES
Chief Complaint   Patient presents with    Diabetes     F/u        Michelle Estrada is a 54 y.o. female had concerns including Diabetes (F/u).    T2DM.    She has been going well overall.  She reports that she has been living with her daughter who now has a boyfriend living with them and her stress is increased significantly.  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  She was supposed to be getting a CGM with insulin pump to help better control her sugars.  She has not been able to get this due to her insurance lapsing.  She reports that she is without insurance for another month.  She has been taking her injections.  She does not check her blood sugar often but when she does check it it is greater than 180.    History:  She is checking blood sugars with Dexcom CGM.  Current medications for diabetes include Toujeo 30 units QHS and Novolog 10 units TID, Metformin  mg BID.  Hypos: rarely  Last optho exam: 2022-Dr. Horton    Most recent A1C:  5.7 (5/11/2022)    The following portions of the patient's history were reviewed and updated as appropriate: allergies, current medications, past family history, past medical history, past social history, past surgical history and problem list.      Review of Systems   Constitutional:  Negative for fatigue.   Gastrointestinal:  Positive for nausea and vomiting.   Endocrine: Positive for polydipsia and polyphagia. Negative for polyuria.   Psychiatric/Behavioral:  Positive for sleep disturbance.    All other systems reviewed and are negative.     Physical Exam  Vitals reviewed.   Constitutional:       Appearance: Normal appearance.   Cardiovascular:      Rate and Rhythm: Normal rate.   Pulmonary:      Effort: Pulmonary effort is normal.   Neurological:      Mental Status: She is alert and oriented to person, place, and time.   Psychiatric:         Mood and Affect: Mood normal.         Behavior: Behavior normal.         Thought  "Content: Thought content normal.         Judgment: Judgment normal.        /61 (BP Location: Right arm, Patient Position: Sitting, Cuff Size: Adult)   Pulse 77   Wt 103 kg (227 lb 12.8 oz)   SpO2 94%   BMI 39.10 kg/m²      Labs and imaging    CMP:  Lab Results   Component Value Date    Glucose 235 (A) 03/24/2025    Glucose 297 (H) 07/15/2024    Glucose 256 (H) 02/08/2022    Glucose Negative 01/30/2022    BUN 11 02/08/2022    BUN/Creatinine Ratio 14 02/08/2022    Creatinine 0.80 02/08/2022    Creatinine, Urine 64.8 10/17/2024    Ketones, UA Trace (A) 11/29/2020    CO2 29.0 08/10/2021    Calcium 8.8 (L) 02/08/2022    Albumin 3.5 02/08/2022    AST (SGOT) 21 02/08/2022    ALT (SGPT) 22 02/08/2022     Lipid Panel:  No results found for: \"CHOL\", \"TRIG\", \"HDL\", \"VLDL\", \"LDL\"    HbA1c:  Lab Results   Component Value Date    HGBA1C 8.0 (A) 03/24/2025     Glucose:      Lab Results   Component Value Date    POCGLU 235 (A) 03/24/2025     Microalbumin:  Lab Results   Component Value Date    MALBCRERATIO 21 10/17/2024     TSH:  No results found for: \"TSH\"    Assessment and plan  Diagnoses and all orders for this visit:    1. Type 2 diabetes mellitus with hyperglycemia, with long-term current use of insulin (Primary)  Assessment & Plan:  -Diabetes is improving with A1c down from 10.6 to 8.0.  -Discussed importance of dietary and exercise guidelines.  -Continue Metformin 500 mg BID.  -Continue Trulicity 4.5 mg weekly. Patient has no personal history of pancreatitis, no family history of MEN syndrome or medullary thyroid cancer. Possible side effects including nausea, bloating, other GI upset and rarely pancreatitis were discussed. She was advised to call the office with any symptoms or concerns.   -Start re-using Dexcom CGM. Will send in RX to DME for this.   -Start using insulin pump regularly once she is able to get supplies when her insurance issue is resolved.   -Increase Toujeo 40 units at night.  -Increase Novolog " 15 units with meals.  -Schedule a diabetic eye exam.  -S?S hypoglycemia reviewed with Rule of 15's advised.   -Follow-up in 1 month.    Orders:  -     POC Glycosylated Hemoglobin (Hb A1C)  -     POC Glucose, Blood         Return in about 3 months (around 6/24/2025) for Follow-up appointment, A1C. The patient was instructed to contact the clinic with any interval questions or concerns.    This document has been electronically signed by JIN Jara  March 24, 2025 14:43 EDT  Endocrinology    Please note that portions of this document were completed with a voice recognition program. Efforts were made to edit the dictations, but occasionally words are mis-transcribed.

## 2025-03-25 RX ORDER — INSULIN PMP CART,AUT,G6/7,CNTR
1 EACH SUBCUTANEOUS EVERY OTHER DAY
Qty: 15 EACH | Refills: 5 | Status: SHIPPED | OUTPATIENT
Start: 2025-03-25

## 2025-03-25 RX ORDER — INSULIN ASPART 100 [IU]/ML
INJECTION, SOLUTION INTRAVENOUS; SUBCUTANEOUS
Qty: 30 ML | Refills: 5 | Status: SHIPPED | OUTPATIENT
Start: 2025-03-25

## 2025-03-25 RX ORDER — METFORMIN HYDROCHLORIDE 500 MG/1
500 TABLET, EXTENDED RELEASE ORAL 2 TIMES DAILY
Qty: 60 TABLET | Refills: 5 | Status: SHIPPED | OUTPATIENT
Start: 2025-03-25

## 2025-03-25 NOTE — PROGRESS NOTES
Specialty Pharmacy Patient Management Program  Endocrinology Reassessment     Michelle Estrada is a 54 y.o. female seen by an Endocrinology Provider for Type 2 Diabetes and enrolled in the Endocrinology Patient Management program offered by [facility].  A follow-up outreach was conducted, including assessment of continued therapy appropriateness, medication adherence, and side effect incidence and management for metformin and insulin therapy.     Pt continues to report that she currently is not using Omnipod due to needing Dexcom G7 sensors from [facility] pharmacy.  Pt previously wasn't able to log into her Omnipod and this issue hasn't been resolved as well (pt previously given Omnipod rep phone number to contact for assistance).  Pt states that she is waiting on a refill for the sensors from the DME pharmacy.  Pt reports doing up to 3 fingersticks per day and that her BG has been 200-250 range recently.    Pt previously reported during clinical assessment on 3/4/25 that she actually stopped all of her medications due to experiencing vomiting after taking her medications.  Pt reports slowly re-introducing her medications but never restarted the Trulicity.     At this time, the pt reports taking Metformin XR 500mg po bid and Novolog 10 units TID prior to meals; pt previously reported taking the Novolog without regard to meals but now reports that she is injecting it prior to meals.  Pt also takes Toujeo 30 units nightly.    The pt reports being 80% compliant with her insulin injections at the moment as she sometimes forgets to take her Novolog before breakfast or won't be home when she eats a meal and gets busy.  Pt denies any low BG < 70 episodes recently.  Pt denies any side effects with her diabetic medications at the moment.      The pt denies any personal or family history of thyroid cancer, denies any issues with pancreatitis, and reports recurrent UTI's/yeast infections.    Changes to  "Insurance Coverage or Financial Support  [employer]    Relevant Past Medical History and Comorbidities  Relevant medical history and concomitant health conditions were discussed with the patient. The patient's chart has been reviewed for relevant past medical history and comorbid health conditions and updated as necessary.   Past Medical History:   Diagnosis Date    Anxiety     Bipolar 1 disorder     COPD (chronic obstructive pulmonary disease)     Depression     Diabetes mellitus     Fibromyalgia     Gestational diabetes 2001    Hyperlipidemia 2009    Hypoglycemia 2001    Neuropathy     Polycystic ovary syndrome 2001    PTSD (post-traumatic stress disorder)     Vertigo     Vitamin D deficiency 2019     Social History     Socioeconomic History    Marital status: Single   Tobacco Use    Smoking status: Every Day     Current packs/day: 1.50     Average packs/day: 1.5 packs/day for 50.0 years (75.0 ttl pk-yrs)     Types: Cigarettes     Start date: 4/1/1975     Passive exposure: Current    Smokeless tobacco: Never    Tobacco comments:     Been smoking since i was 5   Vaping Use    Vaping status: Never Used   Substance and Sexual Activity    Alcohol use: Not Currently     Comment: Never was much of a drinker    Drug use: Not Currently     Types: \"Crack\" cocaine, Cocaine(coke), Marijuana, Methamphetamines     Comment: 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 The patient's chart has been reviewed for allergy information and updated as necessary.   Allergies   Allergen Reactions    Dilantin [Phenytoin] Hives     Pt not sure " "what happened with this medication but knew reaction was severe    Lovastatin Other (See Comments)     \"Boiling from inside out\"    Niacin Other (See Comments)     \"Boiling from inside out\"    Lisinopril Cough    Other Other (See Comments)     ADVICOR - pt stated she was \"boiling from the inside out\"    Wound Dressing Adhesive Other (See Comments)     Blister with adhesives       Allergies reviewed by Marcin Abbott RP on 3/24/2025 at  1:39 PM  Allergies reviewed by Marcin Abbott RP on 3/24/2025 at  1:45 PM    Relevant Laboratory Values  A1C Last 3 Results          10/17/2024    15:47 3/24/2025    13:23   HGBA1C Last 3 Results   Hemoglobin A1C 10.6  8.0      Lab Results   Component Value Date    HGBA1C 8.0 (A) 03/24/2025     Lab Results   Component Value Date    GLUCOSE 94 01/30/2022    CALCIUM 8.8 (L) 02/08/2022     02/08/2022    K 3.7 08/05/2024    CO2 26 02/08/2022     02/08/2022    BUN 11 02/08/2022    CREATININE 0.80 02/08/2022    EGFRIFAFRI >60 02/08/2022    EGFRIFNONA >60 02/08/2022    BCR 14 02/08/2022    ANIONGAP 10 02/08/2022     No results found for: \"CHOL\", \"CHLPL\", \"TRIG\", \"HDL\", \"LDL\", \"LDLDIRECT\"  Microalbumin          10/17/2024    16:21   Microalbumin   Microalbumin, Urine 13.9        Current Medication List  This medication list has been reviewed with the patient and evaluated for any interactions or necessary modifications/recommendations, and updated to include all prescription medications, OTC medications, and supplements the patient is currently taking. This list reflects what is contained in the patient's profile, which has also been marked as reviewed to communicate to other providers it is the most up to date version of the patient's current medication therapy.     Current Outpatient Medications:     albuterol sulfate  (90 Base) MCG/ACT inhaler, INHALE 2 PUFFS BY MOUTH VIA INHALER EVERY 6 (SIX) HOURS AS NEEDED., Disp: , Rfl:     budesonide-formoterol (SYMBICORT) " "160-4.5 MCG/ACT inhaler, Inhale 2 puffs 2 (Two) Times a Day., Disp: , Rfl:     busPIRone (BUSPAR) 10 MG tablet, Take 1 tablet by mouth Every 12 (Twelve) Hours., Disp: , Rfl:     Cariprazine HCl (VRAYLAR) 3 MG capsule capsule, Take 1 capsule by mouth Daily., Disp: , Rfl:     escitalopram (LEXAPRO) 10 MG tablet, Take 1 tablet by mouth Daily. (Patient taking differently: Take 2 tablets by mouth Daily.), Disp: 90 tablet, Rfl: 0    fenofibrate (TRICOR) 145 MG tablet, Take 1 tablet by mouth Daily., Disp: 90 tablet, Rfl: 2    folic acid (FOLVITE) 1 MG tablet, Take 1 tablet by mouth Daily., Disp: , Rfl:     glucose blood test strip, Use as instructed to monitor BG once daily, Disp: 100 each, Rfl: 5    hydrOXYzine (ATARAX) 10 MG tablet, Take 1 tablet by mouth Every 6 (Six) Hours As Needed for Anxiety., Disp: , Rfl:     ibuprofen (ADVIL,MOTRIN) 800 MG tablet, Take 1 tablet by mouth Every 8 (Eight) Hours As Needed., Disp: , Rfl:     Insulin Aspart (NovoLOG) 100 UNIT/ML injection, For use in insulin pump. Maximum Daily Dose: 100 units. (Patient taking differently: Inject 10 Units under the skin into the appropriate area as directed 3 (Three) Times a Day Before Meals. For use in insulin pump. Maximum Daily Dose: 100 units.), Disp: 90 mL, Rfl: 1    Insulin Glargine, 2 Unit Dial, (Toujeo Max SoloStar) 300 UNIT/ML solution pen-injector injection, Inject 60 Units under the skin at bedtime. (Patient taking differently: Inject 30 Units under the skin into the appropriate area as directed every night at bedtime.), Disp: 18 mL, Rfl: 1    Insulin Pen Needle 32G X 4 MM misc, Use to inject insulin as directed up to 4 times daily., Disp: 100 each, Rfl: 5    Insulin Syringe 30G X 5/16\" 1 ML misc, Use 1 each 3 times a day., Disp: 100 each, Rfl: 0    lamoTRIgine (LaMICtal) 100 MG tablet, Take 1 tablet by mouth Daily., Disp: , Rfl:     magnesium oxide (MAGOX) 400 (241.3 Mg) MG tablet tablet, Take 1 tablet by mouth Daily., Disp: , Rfl:     " meclizine (ANTIVERT) 25 MG tablet, Take 1 tablet by mouth 2 (Two) Times a Day As Needed., Disp: , Rfl:     metFORMIN ER (GLUCOPHAGE-XR) 500 MG 24 hr tablet, Take 1 tablet by mouth 2 (Two) Times a Day., Disp: 180 tablet, Rfl: 1    montelukast (SINGULAIR) 10 MG tablet, Take 1 tablet by mouth Every Night., Disp: , Rfl:     rosuvastatin (CRESTOR) 20 MG tablet, Take 1 tablet by mouth Daily., Disp: 90 tablet, Rfl: 1    terbinafine (lamiSIL) 250 MG tablet, Take 1 tablet by mouth on the first 7 days of the month., Disp: , Rfl:     VITAMIN D PO, Take 1 tablet by mouth Daily., Disp: , Rfl:     Continuous Glucose Sensor (Dexcom G7 Sensor) misc, Use 1 each Every 10 (Ten) Days. (Patient not taking: Reported on 3/24/2025), Disp: 3 each, Rfl: 5    fluticasone (FLONASE) 50 MCG/ACT nasal spray, Administer 2 sprays into the nostril(s) as directed by provider Daily. (Patient not taking: Reported on 3/24/2025), Disp: , Rfl:     Insulin Disposable Pump (Omnipod 5 [id number] Pods Gen 5) misc, Use 1 pod Every Other Day. (Patient not taking: Reported on 3/24/2025), Disp: 45 each, Rfl: 1    Medicines reviewed by Marcin Abbott RPH on 3/24/2025 at  1:44 PM  Medicines reviewed by Marcin Abbott RPH on 3/24/2025 at  1:48 PM    Drug Interactions with diabetic medications  -Lexapro can enhance the hypoglycemic effect of antidiabetic agents.  -Vraylar may diminish the effect of antidiabetic agents.  -Ibuprofen and Lamictal may enhance the adverse effects of metformin, including lactic acidosis.     Recommended Medications Assessment  Aspirin: Not currently taking  Statin: Currently Taking   ACEi/ARB: Not Taking Currently    Adverse Drug Reactions  Adverse Reactions Experienced: None  Medication tolerability: Tolerating with no to minimal ADRs  Medication plan: Continue therapy with closer monitoring (Pt needing Dexcom G7 sensors in order to restart Omnipods. Follow closey to ensure pt receives sensor.)  Plan for ADR Management: None  required    Adherence, Self-Administration, and Current Therapy Problems  Adherence related to the patient's specialty therapy was discussed with the patient. The Adherence segment of this outreach has been reviewed and updated.   Adherence Questions  Linked Medication(s) Assessed: metFORMIN HCl (GLUCOPHAGE-XR), Insulin Aspart (novoLOG) (Toujeo)  On average, how many doses/injections does the patient miss per month?: >10  What are the identified reasons for non-adherence or missed doses? : other, patient forgets  List other reason(s) for non-adherence or missed doses: Pt is currently taking Toujeo and Novolog injections along with Metformin.  Pt reports being 80% adherent with these injections.  Pt still not restarted pump yet as she is still waiting to receive her Dexcom G7 sensors from DME.  What is the estimated medication adherence level?: 80-89%  Based on the patient/caregiver response and refill history, does this patient require an MTP to track adherence improvements?: yes (Creating MTP to follow and ensure pt receives sensors so she can restart on her Omnipod.)    Additional Barriers to Patient Self-Administration: Pt continues to report that she currently is not using Omnipod due to needing Dexcom G7 sensors from DME pharmacy.  Pt previously wasn't able to log into her Omnipod and this issue hasn't been resolved as well (pt previously given Omnipod rep phone number to contact for assistance).  Pt states that she is waiting on a refill for the sensors from the DME pharmacy  Methods for Supporting Patient Self-Administration: Provided pt on 3/4/25 with direct phone number for Omnipod rep to assist with getting logged in. Endo provider is sending in Dexcom G7 sensor rx to DME pharmacy so that pt can receive new shipment.  Notified registration staff of this so that they can follow as well.    Of note, pt states that she is a smoker and is trying to quit.    Goals of Therapy  Goals related to the patient's  specialty therapy was discussed with the patient. The Patient Goals segment of this outreach has been reviewed and updated.    Goals Addressed Today        HEMOGLOBIN A1C < 7       Pt not at goal as most recent A1c = 8.0%. Provider increased Toujeo and Novolog doses for pt to take until she can restart her insulin pump.            Quality of Life Improvement Scale: 8-Moderately better  Reassessment Plan & Follow-Up  Patient diabetes is not controlled with most recent A1c at 8.0%.  Medication Therapy Changes: Per Lana ABDI:  Continue Metformin ER 500mg po bid  Until pt can restart on insulin pump, continue insulin at the following doses:  Increase Toujeo to 40 units nightly  Increase Novolog to 15 units with meals  Continue Dexcom G7 sensors once she receives shipment from [facility] pharmacy.  Of note, provider Lana ABDI stated after visit that pt can restart back on Toujeo at lowest dose if she would like to do so (pt previously stopped due to nausea).  Attempted to call pt, no answer from pt.  Related Plans, Therapy Recommendations, or Issues to Be Addressed:   MTP created to follow Omnipod adherence.  Instructed pt to call Endocrinology office if she doesn't receive Dexcom G7 sensor from [facility] pharmacy or if she can't get ahold of the Omnipod rep.  Recommend lipid panel be drawn.  Recommended Prevnar-20 and yearly flu vaccine.  Educated pt that she can take her novolog with her if she is going to eat a meal not at home as the novolog is stable at room temperature for 28 days, as this can help increase compliance with her insulin.  Pt stated during visit today that she won't have rx insurance until next month, however confirmed via [facility] that pt does have active rx insurance and this was communicated to the pt.  Pt states that at this time she does not need any refills on her Omnipods, Novolog, or Toujeo as she has a supply at home.  Will have PharmD reach back out to pt in 3 weeks to see if she has  received her sensors and restarted back on her pump.  Pharmacist to perform regular reassessments no more than (6) months from the previous assessment.  Care Coordinator to set up future refill outreaches, coordinate prescription delivery, and escalate clinical questions to pharmacist.     Attestation  I attest the patient was actively involved in and has agreed to the above plan of care. If the prescribed therapy is at any point deemed not appropriate based on the current or future assessments, a consultation will be initiated with the patient's specialty care provider to determine the best course of action. The revised plan of therapy will be documented along with any required assessments and/or additional patient education provided.    Marcin Abbott RPH  03/25/25  13:12 EDT

## 2025-04-07 ENCOUNTER — TELEPHONE (OUTPATIENT)
Dept: PHARMACY | Facility: HOSPITAL | Age: 55
End: 2025-04-07
Payer: MEDICARE

## 2025-04-07 NOTE — TELEPHONE ENCOUNTER
Patients insurance is no longer contracted with office. We will be disenrolling her from pharmacy services. Per Danelle patient is aware that she can be rescheduled if she changes insurances during open enrollment.